# Patient Record
Sex: FEMALE | Race: WHITE | Employment: PART TIME | URBAN - METROPOLITAN AREA
[De-identification: names, ages, dates, MRNs, and addresses within clinical notes are randomized per-mention and may not be internally consistent; named-entity substitution may affect disease eponyms.]

---

## 2017-03-02 ENCOUNTER — CLINICAL SUPPORT (OUTPATIENT)
Dept: FAMILY MEDICINE CLINIC | Age: 22
End: 2017-03-02

## 2017-03-02 DIAGNOSIS — N94.6 PAINFUL MENSTRUAL PERIODS: Primary | ICD-10-CM

## 2017-03-02 RX ORDER — MEDROXYPROGESTERONE ACETATE 150 MG/ML
150 INJECTION, SUSPENSION INTRAMUSCULAR ONCE
Qty: 1 ML | Refills: 0 | Status: SHIPPED | COMMUNITY
Start: 2017-03-02 | End: 2017-03-02

## 2017-03-02 NOTE — MR AVS SNAPSHOT
Visit Information Date & Time Provider Department Dept. Phone Encounter #  
 3/2/2017 10:00 AM Rosita Shelton [place] [address] [phone] [id number] Upcoming Health Maintenance Date Due Pneumococcal 19-64 Medium Risk (1 of 1 - PPSV23) 9/1/2013 PAP AKA CERVICAL CYTOLOGY 12/16/2018 DTaP/Tdap/Td series (2 - Td) 12/1/2020 Allergies as of 3/2/2017  Review Complete On: 3/2/2017 By: Ramya Dudley LPN No Known Allergies Current Immunizations  Never Reviewed No immunizations on file. Not reviewed this visit You Were Diagnosed With   
  
 Codes Comments Painful menstrual periods    -  Primary ICD-10-CM: N94.6 ICD-9-CM: 491. 3 Vitals OB Status Injection Preferred Pharmacy Pharmacy Name Phone [facility], [address] [phone] Your Updated Medication List  
  
   
This list is accurate as of: 3/2/17 10:17 AM.  Always use your most recent med list.  
  
  
  
  
 * medroxyPROGESTERone 150 mg/mL Syrg Commonly known as:  DEPO-PROVERA  
1 mL by IntraMUSCular route every three (3) months. * medroxyPROGESTERone 150 mg/mL injection Commonly known as:  DEPO-PROVERA  
1 mL by IntraMUSCular route once for 1 dose. * Notice: This list has 2 medication(s) that are the same as other medications prescribed for you. Read the directions carefully, and ask your doctor or other care provider to review them with you. We Performed the Following [place] THER/PROPH/DIAG INJECTION, SUBCUT/IM [id number] CPT(R)] Patient Instructions Next depo- Provera injection due between may 18 and June 1, 2017. Introducing [facility] & HEALTH SERVICES! Kathe Aguilar introduces [employer] patient portal. Now you can access parts of your medical record, email your doctor's office, and request medication refills online.    
 
1. In your internet browser, go to [email or web address] 2. Click on the First Time User? Click Here link in the Sign In box. You will see the New Member Sign Up page. 3. Enter your [facility] [employer] Access Code exactly as it appears below. You will not need to use this code after youve completed the sign-up process. If you do not sign up before the expiration date, you must request a new code. · [facility] Access Code: [email or web address] Expires: 3/20/2017  8:08 AM 
 
4. Enter the last four digits of your Social Security Number (xxxx) and Date of Birth (mm/dd/yyyy) as indicated and click Submit. You will be taken to the next sign-up page. 5. Create a [place] Self Representation Document Preparationt ID. This will be your [facility] login ID and cannot be changed, so think of one that is secure and easy to remember. 6. Create a [facility] [facility] password. You can change your password at any time. 7. Enter your Password Reset Question and Answer. This can be used at a later time if you forget your password. 8. Enter your e-mail address. You will receive e-mail notification when new information is available in [address] 9. Click Sign Up. You can now view and download portions of your medical record. 10. Click the Download Summary menu link to download a portable copy of your medical information. If you have questions, please visit the Frequently Asked Questions section of the [employer] website. Remember, [employer] is NOT to be used for urgent needs. For medical emergencies, dial 911. Now available from your iPhone and Android! Please provide this summary of care documentation to your next provider. Your primary care clinician is listed as Kelsey Delacruz. If you have any questions after today's visit, please call [phone].

## 2017-05-18 ENCOUNTER — CLINICAL SUPPORT (OUTPATIENT)
Dept: FAMILY MEDICINE CLINIC | Age: 22
End: 2017-05-18

## 2017-05-18 DIAGNOSIS — N94.6 PAINFUL MENSTRUAL PERIODS: Primary | ICD-10-CM

## 2017-05-18 RX ORDER — MEDROXYPROGESTERONE ACETATE 150 MG/ML
150 INJECTION, SUSPENSION INTRAMUSCULAR ONCE
Qty: 1 ML | Refills: 0 | Status: SHIPPED | COMMUNITY
Start: 2017-05-18 | End: 2017-05-18

## 2017-05-18 NOTE — MR AVS SNAPSHOT
Visit Information Date & Time Provider Department Dept. Phone Encounter #  
 5/18/2017  1:00 PM Rosita Shelton [facility] 910 [phone] [id number] Upcoming Health Maintenance Date Due Pneumococcal 19-64 Medium Risk (1 of 1 - PPSV23) 9/1/2013 INFLUENZA AGE 9 TO ADULT 8/1/2017 PAP AKA CERVICAL CYTOLOGY 12/16/2018 DTaP/Tdap/Td series (2 - Td) 12/1/2020 Allergies as of 5/18/2017  Review Complete On: 5/18/2017 By: Rossy Connor LPN No Known Allergies Current Immunizations  Never Reviewed No immunizations on file. Not reviewed this visit You Were Diagnosed With   
  
 Codes Comments Painful menstrual periods    -  Primary ICD-10-CM: N94.6 ICD-9-CM: 907. 3 Vitals OB Status Smoking Status Injection Current Every Day Smoker Preferred Pharmacy Pharmacy Name Phone [facility], [address] [phone] Your Updated Medication List  
  
   
This list is accurate as of: 5/18/17  1:08 PM.  Always use your most recent med list.  
  
  
  
  
 * medroxyPROGESTERone 150 mg/mL Syrg Commonly known as:  DEPO-PROVERA  
1 mL by IntraMUSCular route every three (3) months. * medroxyPROGESTERone 150 mg/mL injection Commonly known as:  DEPO-PROVERA  
1 mL by IntraMUSCular route once for 1 dose. * Notice: This list has 2 medication(s) that are the same as other medications prescribed for you. Read the directions carefully, and ask your doctor or other care provider to review them with you. We Performed the Following [place] THER/PROPH/DIAG INJECTION, SUBCUT/IM [id number] CPT(R)] Patient Instructions Next depo- Provera injection due between August 3 and 17, 2017 Introducing [facility] & HEALTH SERVICES!    
 Peg Hemp introduces [employer] patient portal. Now you can access parts of your medical record, email your doctor's office, and request medication refills online. 1. In your internet browser, go to [email or web address] 2. Click on the First Time User? Click Here link in the Sign In box. You will see the New Member Sign Up page. 3. Enter your [employer] Access Code exactly as it appears below. You will not need to use this code after youve completed the sign-up process. If you do not sign up before the expiration date, you must request a new code. · [employer] Access Code: [facility] Expires: 8/16/2017  1:07 PM 
 
4. Enter the last four digits of your Social Security Number (xxxx) and Date of Birth (mm/dd/yyyy) as indicated and click Submit. You will be taken to the next sign-up page. 5. Create a Co[employer]estt ID. This will be your [employer] login ID and cannot be changed, so think of one that is secure and easy to remember. 6. Create a [employer] password. You can change your password at any time. 7. Enter your Password Reset Question and Answer. This can be used at a later time if you forget your password. 8. Enter your e-mail address. You will receive e-mail notification when new information is available in [address] 9. Click Sign Up. You can now view and download portions of your medical record. 10. Click the Download Summary menu link to download a portable copy of your medical information. If you have questions, please visit the Frequently Asked Questions section of the [employer] website. Remember, [employer] is NOT to be used for urgent needs. For medical emergencies, dial 911. Now available from your iPhone and Android! Please provide this summary of care documentation to your next provider. Your primary care clinician is listed as Jana Estrella. If you have any questions after today's visit, please call [phone].

## 2017-05-18 NOTE — PROGRESS NOTES
Given depo-provera injection as verbally ordered by Deisy Rich NP.  Verbal and written instructions on when next injection due and she voiced understanding

## 2017-08-17 ENCOUNTER — CLINICAL SUPPORT (OUTPATIENT)
Dept: FAMILY MEDICINE CLINIC | Age: 22
End: 2017-08-17

## 2017-08-17 VITALS
DIASTOLIC BLOOD PRESSURE: 64 MMHG | HEART RATE: 98 BPM | RESPIRATION RATE: 14 BRPM | HEIGHT: 63 IN | TEMPERATURE: 98 F | OXYGEN SATURATION: 97 % | SYSTOLIC BLOOD PRESSURE: 105 MMHG | BODY MASS INDEX: 21.09 KG/M2 | WEIGHT: 119 LBS

## 2017-08-17 DIAGNOSIS — N94.6 PAINFUL MENSTRUAL PERIODS: Primary | ICD-10-CM

## 2017-08-17 RX ORDER — MEDROXYPROGESTERONE ACETATE 150 MG/ML
150 INJECTION, SUSPENSION INTRAMUSCULAR ONCE
Qty: 1 ML | Refills: 0 | Status: SHIPPED | COMMUNITY
Start: 2017-08-17 | End: 2017-08-17

## 2017-08-17 NOTE — PROGRESS NOTES
Depo-provera injection given as verbally ordered by Dr. Anna Palma. She voiced understanding of when to come back for next depo-provera injection and AVS given with documentation.

## 2017-08-17 NOTE — MR AVS SNAPSHOT
Visit Information Date & Time Provider Department Dept. Phone Encounter #  
 8/17/2017 10:10 AM Rosita Shelton [place] [address] [phone] [id number] Upcoming Health Maintenance Date Due Pneumococcal 19-64 Medium Risk (1 of 1 - PPSV23) 9/1/2013 INFLUENZA AGE 9 TO ADULT 8/1/2017 PAP AKA CERVICAL CYTOLOGY 12/16/2018 DTaP/Tdap/Td series (2 - Td) 12/1/2020 Allergies as of 8/17/2017  Review Complete On: 8/17/2017 By: Anthony Johansen LPN No Known Allergies Current Immunizations  Never Reviewed No immunizations on file. Not reviewed this visit You Were Diagnosed With   
  
 Codes Comments Painful menstrual periods    -  Primary ICD-10-CM: N94.6 ICD-9-CM: 614. 3 Vitals OB Status Smoking Status Injection Current Every Day Smoker Preferred Pharmacy Pharmacy Name Phone [facility], [address] [phone] Your Updated Medication List  
  
   
This list is accurate as of: 8/17/17 11:00 AM.  Always use your most recent med list.  
  
  
  
  
 * medroxyPROGESTERone 150 mg/mL Syrg Commonly known as:  DEPO-PROVERA  
1 mL by IntraMUSCular route every three (3) months. * medroxyPROGESTERone 150 mg/mL injection Commonly known as:  DEPO-PROVERA  
1 mL by IntraMUSCular route once for 1 dose. * Notice: This list has 2 medication(s) that are the same as other medications prescribed for you. Read the directions carefully, and ask your doctor or other care provider to review them with you. We Performed the Following [place] THER/PROPH/DIAG INJECTION, SUBCUT/IM [id number] CPT(R)] Patient Instructions Next Depo-Provera injection due November 2-16, 2017 Introducing [facility] & HEALTH SERVICES!    
 [facility] introduces [place] patient portal. Now you can access parts of your medical record, email your doctor's office, and request medication refills online. 1. In your internet browser, go to [email or web address] 2. Click on the First Time User? Click Here link in the Sign In box. You will see the New Member Sign Up page. 3. Enter your [employer] Access Code exactly as it appears below. You will not need to use this code after youve completed the sign-up process. If you do not sign up before the expiration date, you must request a new code. · [employer] Access Code: [email or web address] Expires: 11/15/2017 11:00 AM 
 
4. Enter the last four digits of your Social Security Number (xxxx) and Date of Birth (mm/dd/yyyy) as indicated and click Submit. You will be taken to the next sign-up page. 5. Create a [employer] ID. This will be your [employer] login ID and cannot be changed, so think of one that is secure and easy to remember. 6. Create a [employer] password. You can change your password at any time. 7. Enter your Password Reset Question and Answer. This can be used at a later time if you forget your password. 8. Enter your e-mail address. You will receive e-mail notification when new information is available in [address] 9. Click Sign Up. You can now view and download portions of your medical record. 10. Click the Download Summary menu link to download a portable copy of your medical information. If you have questions, please visit the Frequently Asked Questions section of the [employer] website. Remember, [employer] is NOT to be used for urgent needs. For medical emergencies, dial 911. Now available from your iPhone and Android! Please provide this summary of care documentation to your next provider. Your primary care clinician is listed as Irma Bray. If you have any questions after today's visit, please call [phone].

## 2017-09-12 RX ORDER — MEDROXYPROGESTERONE ACETATE 150 MG/ML
150 INJECTION, SUSPENSION INTRAMUSCULAR
Qty: 1 SYRINGE | Refills: 3
Start: 2017-09-12 | End: 2018-01-30 | Stop reason: SDUPTHER

## 2017-09-12 NOTE — TELEPHONE ENCOUNTER
Chief Complaint   Patient presents with    Medication Refill     Last refill:   8 months ago (12/20/2016)        medroxyPROGESTERone (DEPO-PROVERA) 150 mg/mL syrg       1 mL by IntraMUSCular route every three (3) months.       Dispense: 1 Syringe     Refills: 3     Start: 2/20/2017     By: Omid Newton MD       Future Appointments:  11/10/2017 3:00 PM    He Dc      Last Appointment With Me:  Visit date not found      Last Appointment My Department:  8/17/2017

## 2017-11-10 ENCOUNTER — CLINICAL SUPPORT (OUTPATIENT)
Dept: FAMILY MEDICINE CLINIC | Age: 22
End: 2017-11-10

## 2017-11-10 VITALS
HEIGHT: 63 IN | OXYGEN SATURATION: 95 % | SYSTOLIC BLOOD PRESSURE: 110 MMHG | BODY MASS INDEX: 20.55 KG/M2 | HEART RATE: 88 BPM | RESPIRATION RATE: 18 BRPM | DIASTOLIC BLOOD PRESSURE: 73 MMHG | TEMPERATURE: 98.5 F | WEIGHT: 116 LBS

## 2017-11-10 DIAGNOSIS — N94.6 PAINFUL MENSTRUATION: Primary | ICD-10-CM

## 2017-11-10 RX ORDER — MEDROXYPROGESTERONE ACETATE 150 MG/ML
150 INJECTION, SUSPENSION INTRAMUSCULAR ONCE
Qty: 1 ML | Refills: 0 | Status: SHIPPED | COMMUNITY
Start: 2017-11-10 | End: 2017-11-10

## 2017-11-10 NOTE — PROGRESS NOTES
Chief Complaint   Patient presents with    Depo     Administered Medrocxyprogesterone Acetate Injectable suspension by IM in Left Glutues, pt tolerated well. Patient instructions given to return between January 6-February 9.

## 2017-11-10 NOTE — MR AVS SNAPSHOT
Visit Information Date & Time Provider Department Dept. Phone Encounter #  
 11/10/2017  3:00 PM Rosita Shelton [facility] 74 [phone] [id number] Upcoming Health Maintenance Date Due Pneumococcal 19-64 Medium Risk (1 of 1 - PPSV23) 9/1/2013 Influenza Age 5 to Adult 8/1/2017 PAP AKA CERVICAL CYTOLOGY 12/16/2018 DTaP/Tdap/Td series (2 - Td) 12/1/2020 Allergies as of 11/10/2017  Review Complete On: 11/10/2017 By: Caryl Sandy LPN No Known Allergies Current Immunizations  Never Reviewed No immunizations on file. Not reviewed this visit You Were Diagnosed With   
  
 Codes Comments Painful menstruation    -  Primary ICD-10-CM: N94.6 ICD-9-CM: 354. 3 Vitals BP Pulse Temp Resp Height(growth percentile) Weight(growth percentile) 110/73 (BP 1 Location: Right arm, BP Patient Position: Sitting) 88 98.5 °F (36.9 °C) (Oral) 18 5' 3\" (1.6 m) 116 lb (52.6 kg) SpO2 BMI OB Status Smoking Status 95% 20.55 kg/m2 Injection Current Every Day Smoker BMI and BSA Data Body Mass Index Body Surface Area 20.55 kg/m 2 1.53 m 2 Preferred Pharmacy Pharmacy Name Phone [facility], [address] [phone] Your Updated Medication List  
  
   
This list is accurate as of: 11/10/17  3:10 PM.  Always use your most recent med list.  
  
  
  
  
 * medroxyPROGESTERone 150 mg/mL Syrg Commonly known as:  DEPO-PROVERA  
1 mL by IntraMUSCular route every three (3) months. * medroxyPROGESTERone 150 mg/mL injection Commonly known as:  DEPO-PROVERA  
1 mL by IntraMUSCular route once for 1 dose. * Notice: This list has 2 medication(s) that are the same as other medications prescribed for you. Read the directions carefully, and ask your doctor or other care provider to review them with you. We Performed the Following [place] THER/PROPH/DIAG INJECTION, SUBCUT/IM [id number] CPT(R)] Patient Instructions Patient to return January 26-February 9, 2018 Introducing [facility] & HEALTH SERVICES! Justen Camarena introduces [employer] patient portal. Now you can access parts of your medical record, email your doctor's office, and request medication refills online. 1. In your internet browser, go to [email or web address].[email or web address] Services. Wellcoin/D.[employer] Loans & Services 2. Click on the First Time User? Click Here link in the Sign In box. You will see the New Member Sign Up page. 3. Enter your Ku[employer] Access Code exactly as it appears below. You will not need to use this code after youve completed the sign-up process. If you do not sign up before the expiration date, you must request a new code. · Ku[employer] Access Code: [email or web address] Expires: 11/15/2017 10:00 AM 
 
4. Enter the last four digits of your Social Security Number (xxxx) and Date of Birth (mm/dd/yyyy) as indicated and click Submit. You will be taken to the next sign-up page. 5. Create a Kuapay ID. This will be your Ku[employer] login ID and cannot be changed, so think of one that is secure and easy to remember. 6. Create a Ku[employer]ay password. You can change your password at any time. 7. Enter your Password Reset Question and Answer. This can be used at a later time if you forget your password. 8. Enter your e-mail address. You will receive e-mail notification when new information is available in [address] 9. Click Sign Up. You can now view and download portions of your medical record. 10. Click the Download Summary menu link to download a portable copy of your medical information. If you have questions, please visit the Frequently Asked Questions section of the Ku[employer] website. Remember, Ku[employer]ay is NOT to be used for urgent needs. For medical emergencies, dial 911. Now available from your iPhone and Android! Please provide this summary of care documentation to your next provider. Your primary care clinician is listed as Sulaiman Henry. If you have any questions after today's visit, please call [phone].

## 2018-01-30 RX ORDER — MEDROXYPROGESTERONE ACETATE 150 MG/ML
150 INJECTION, SUSPENSION INTRAMUSCULAR
Qty: 1 SYRINGE | Refills: 3
Start: 2018-01-30 | End: 2019-02-05 | Stop reason: SDUPTHER

## 2018-02-01 ENCOUNTER — TELEPHONE (OUTPATIENT)
Dept: FAMILY MEDICINE CLINIC | Age: 23
End: 2018-02-01

## 2019-02-05 ENCOUNTER — OFFICE VISIT (OUTPATIENT)
Dept: FAMILY MEDICINE CLINIC | Age: 24
End: 2019-02-05

## 2019-02-05 VITALS
HEART RATE: 97 BPM | OXYGEN SATURATION: 97 % | TEMPERATURE: 99 F | BODY MASS INDEX: 23.39 KG/M2 | HEIGHT: 63 IN | DIASTOLIC BLOOD PRESSURE: 82 MMHG | WEIGHT: 132 LBS | SYSTOLIC BLOOD PRESSURE: 137 MMHG | RESPIRATION RATE: 16 BRPM

## 2019-02-05 DIAGNOSIS — N92.6 IRREGULAR MENSES: ICD-10-CM

## 2019-02-05 DIAGNOSIS — Z87.09 HISTORY OF STREP PHARYNGITIS: ICD-10-CM

## 2019-02-05 DIAGNOSIS — J02.9 SORE THROAT: ICD-10-CM

## 2019-02-05 DIAGNOSIS — R50.9 FEVER, UNSPECIFIED FEVER CAUSE: ICD-10-CM

## 2019-02-05 DIAGNOSIS — J06.9 URI WITH COUGH AND CONGESTION: Primary | ICD-10-CM

## 2019-02-05 LAB
HCG URINE, QL. (POC): NEGATIVE
S PYO AG THROAT QL: NEGATIVE
VALID INTERNAL CONTROL?: YES

## 2019-02-05 RX ORDER — MEDROXYPROGESTERONE ACETATE 150 MG/ML
150 INJECTION, SUSPENSION INTRAMUSCULAR
Qty: 1 SYRINGE | Refills: 3
Start: 2019-02-05 | End: 2019-02-06 | Stop reason: SDUPTHER

## 2019-02-05 RX ORDER — AZITHROMYCIN 250 MG/1
TABLET, FILM COATED ORAL
Qty: 6 TAB | Refills: 0 | Status: SHIPPED | OUTPATIENT
Start: 2019-02-05 | End: 2019-02-10

## 2019-02-05 NOTE — PROGRESS NOTES
Identified pt with two pt identifiers(name and ). Chief Complaint   Patient presents with    New Patient    Sore Throat    Generalized Body Aches    Cough    Nasal Discharge    Contraception     would like to discuss changing BC from depo to another form as she has been on this for 9 years and has read it is not safe to be on for extended periods of time        Health Maintenance Due   Topic    Pneumococcal 19-64 Medium Risk (1 of 1 - PPSV23)    PAP AKA CERVICAL CYTOLOGY        Wt Readings from Last 3 Encounters:   19 132 lb (59.9 kg)   11/10/17 116 lb (52.6 kg)   17 119 lb (54 kg)     Temp Readings from Last 3 Encounters:   19 99 °F (37.2 °C) (Oral)   11/10/17 98.5 °F (36.9 °C) (Oral)   17 98 °F (36.7 °C)     BP Readings from Last 3 Encounters:   19 137/82   11/10/17 110/73   17 105/64     Pulse Readings from Last 3 Encounters:   19 97   11/10/17 88   17 98         Learning Assessment:  :     Learning Assessment 2016   PRIMARY LEARNER Patient Patient   HIGHEST LEVEL OF EDUCATION - PRIMARY LEARNER  SOME COLLEGE -   BARRIERS PRIMARY LEARNER NONE -   CO-LEARNER CAREGIVER No -   PRIMARY LANGUAGE ENGLISH ENGLISH   LEARNER PREFERENCE PRIMARY DEMONSTRATION DEMONSTRATION     LISTENING -     READING -   ANSWERED BY patient pt   RELATIONSHIP SELF SELF       Depression Screening:  :     PHQ over the last two weeks 2019   Little interest or pleasure in doing things Not at all   Feeling down, depressed, irritable, or hopeless Not at all   Total Score PHQ 2 0       Fall Risk Assessment:  :     No flowsheet data found. Abuse Screening:  :     Abuse Screening Questionnaire 2019   Do you ever feel afraid of your partner? N   Are you in a relationship with someone who physically or mentally threatens you? N   Is it safe for you to go home?  Y       Coordination of Care Questionnaire:  :     1) Have you been to an emergency room, urgent care clinic since your last visit? no   Hospitalized since your last visit? no             2) Have you seen or consulted any other health care providers outside of [address] [place] [address] since your last visit? no  (Include any pap smears or colon screenings in this section.)    3) Do you have an Advance Directive on file? no  Are you interested in receiving information about Advance Directives? no    Patient is accompanied by self. Reviewed record in preparation for visit and have obtained necessary documentation. Medication reconciliation up to date and corrected with patient at this time. Order for POC Rapid Strep Testing placed per [facility] Karlene's verbal order.

## 2019-02-05 NOTE — PATIENT INSTRUCTIONS

## 2019-02-05 NOTE — PROGRESS NOTES
HISTORY OF PRESENT ILLNESS  Rachael Diaz is a 24 y.o. female. HPI  Pt presents as a new patient with \"refills of depo and cold symptoms\"    Pt states that she has been on depo for 9 years, and has really liked the way that it regulated her periods. She is a smoker, and does not want to quit smoking. She has no interest in an IUD. Pt states that she is willing to try a different form of contraception, or will stay on depo, should I think this is best.    In addition, patient has been dealing with cold symptoms  Sore throat  Cough  Nasal congestion  Body aches  Diarrhea  No fever  Review of Systems   Constitutional: Negative for fever. HENT: Positive for congestion and sore throat. Respiratory: Positive for cough. Gastrointestinal: Positive for diarrhea. Physical Exam   Constitutional: She is oriented to person, place, and time. She appears well-developed and well-nourished. HENT:   Head: Normocephalic and atraumatic. Right Ear: Hearing, tympanic membrane, external ear and ear canal normal.   Left Ear: Hearing, tympanic membrane, external ear and ear canal normal.   Nose: Mucosal edema and rhinorrhea present. Mouth/Throat: Posterior oropharyngeal erythema present. Neck: Normal range of motion. Neck supple. Cardiovascular: Normal rate, regular rhythm and normal heart sounds. Pulmonary/Chest: Effort normal. She has wheezes in the right upper field and the left upper field. She has rhonchi in the right lower field and the left lower field. Lymphadenopathy:     She has no cervical adenopathy. Neurological: She is alert and oriented to person, place, and time. Skin: Skin is warm and dry. Psychiatric: She has a normal mood and affect. Her behavior is normal.       ASSESSMENT and PLAN    ICD-10-CM ICD-9-CM    1. URI with cough and congestion J06.9 465.9 azithromycin (ZITHROMAX) 250 mg tablet   2. Sore throat J02.9 462 AMB POC RAPID STREP TEST   3.  History of strep pharyngitis Z87.09 V12.09 AMB POC RAPID STREP TEST   4. Fever, unspecified fever cause R50.9 780.60 AMB POC RAPID STREP TEST   5. Irregular menses N92.6 626.4 AMB POC URINE PREGNANCY TEST, VISUAL COLOR COMPARISON      medroxyPROGESTERone (DEPO-PROVERA) 150 mg/mL syrg     Informed patient that I have sent medication to the pharmacy, and she should take as prescribed. Educated about risks of OCP with smoking, and depo is safest option at this time, if patient would not like an IUD. Pt informed to return to office with worsening of symptoms, or PRN with any questions or concerns. Pt verbalizes understanding of plan of care and denies further questions or concerns at this time.

## 2019-02-06 RX ORDER — MEDROXYPROGESTERONE ACETATE 150 MG/ML
150 INJECTION, SUSPENSION INTRAMUSCULAR
Qty: 1 SYRINGE | Refills: 3 | Status: SHIPPED | OUTPATIENT
Start: 2019-02-06 | End: 2020-02-17

## 2019-11-11 DIAGNOSIS — N92.6 IRREGULAR MENSES: ICD-10-CM

## 2019-11-11 RX ORDER — MEDROXYPROGESTERONE ACETATE 150 MG/ML
150 INJECTION, SUSPENSION INTRAMUSCULAR
Qty: 1 SYRINGE | Refills: 3
Start: 2019-11-11

## 2019-11-11 NOTE — TELEPHONE ENCOUNTER
It looks like there should still be refills for DEPO at [facility] from original RX 2/6/19. It had refills good for a year.

## 2020-01-20 ENCOUNTER — OFFICE VISIT (OUTPATIENT)
Dept: FAMILY MEDICINE CLINIC | Age: 25
End: 2020-01-20

## 2020-01-20 ENCOUNTER — HOSPITAL ENCOUNTER (OUTPATIENT)
Dept: LAB | Age: 25
Discharge: HOME | End: 2020-01-20

## 2020-01-20 VITALS
RESPIRATION RATE: 18 BRPM | SYSTOLIC BLOOD PRESSURE: 120 MMHG | HEART RATE: 88 BPM | BODY MASS INDEX: 21.44 KG/M2 | HEIGHT: 63 IN | WEIGHT: 121 LBS | DIASTOLIC BLOOD PRESSURE: 70 MMHG

## 2020-01-20 DIAGNOSIS — R42 DIZZINESS: Primary | ICD-10-CM

## 2020-01-20 DIAGNOSIS — R42 DIZZINESS: ICD-10-CM

## 2020-01-20 DIAGNOSIS — Z13.1 SCREENING FOR DIABETES MELLITUS: ICD-10-CM

## 2020-01-20 DIAGNOSIS — Z13.29 SCREENING FOR THYROID DISORDER: ICD-10-CM

## 2020-01-20 NOTE — PATIENT INSTRUCTIONS
Dizziness: Care Instructions  Your Care Instructions  Dizziness is the feeling of unsteadiness or fuzziness in your head. It is different than having vertigo, which is a feeling that the room is spinning or that you are moving or falling. It is also different from lightheadedness, which is the feeling that you are about to faint. It can be hard to know what causes dizziness. Some people feel dizzy when they have migraine headaches. Sometimes bouts of flu can make you feel dizzy. Some medical conditions, such as heart problems or high blood pressure, can make you feel dizzy. Many medicines can cause dizziness, including medicines for high blood pressure, pain, or anxiety. If a medicine causes your symptoms, your doctor may recommend that you stop or change the medicine. If it is a problem with your heart, you may need medicine to help your heart work better. If there is no clear reason for your symptoms, your doctor may suggest watching and waiting for a while to see if the dizziness goes away on its own. Follow-up care is a key part of your treatment and safety. Be sure to make and go to all appointments, and call your doctor if you are having problems. It's also a good idea to know your test results and keep a list of the medicines you take. How can you care for yourself at home? · If your doctor recommends or prescribes medicine, take it exactly as directed. Call your doctor if you think you are having a problem with your medicine. · Do not drive while you feel dizzy. · Try to prevent falls. Steps you can take include:  ? Using nonskid mats, adding grab bars near the tub, and using night-lights. ? Clearing your home so that walkways are free of anything you might trip on.  ? Letting family and friends know that you have been feeling dizzy. This will help them know how to help you. When should you call for help? Call 911 anytime you think you may need emergency care.  For example, call if:    · You passed out (lost consciousness).     · You have dizziness along with symptoms of a heart attack. These may include:  ? Chest pain or pressure, or a strange feeling in the chest.  ? Sweating. ? Shortness of breath. ? Nausea or vomiting. ? Pain, pressure, or a strange feeling in the back, neck, jaw, or upper belly or in one or both shoulders or arms. ? Lightheadedness or sudden weakness. ? A fast or irregular heartbeat.     · You have symptoms of a stroke. These may include:  ? Sudden numbness, tingling, weakness, or loss of movement in your face, arm, or leg, especially on only one side of your body. ? Sudden vision changes. ? Sudden trouble speaking. ? Sudden confusion or trouble understanding simple statements. ? Sudden problems with walking or balance. ? A sudden, severe headache that is different from past headaches.    Call your doctor now or seek immediate medical care if:    · You feel dizzy and have a fever, headache, or ringing in your ears.     · You have new or increased nausea and vomiting.     · Your dizziness does not go away or comes back.    Watch closely for changes in your health, and be sure to contact your doctor if:    · You do not get better as expected. Where can you learn more? Go to http://gil-joey.info/. Enter Z419 in the search box to learn more about \"Dizziness: Care Instructions. \"  Current as of: June 26, 2019  Content Version: 12.2  © 5580-1025 [employer]. Care instructions adapted under license by [employer] (which disclaims liability or warranty for this information). If you have questions about a medical condition or this instruction, always ask your healthcare professional. [place] [address] any warranty or liability for your use of this information.

## 2020-01-20 NOTE — PROGRESS NOTES
HISTORY OF PRESENT ILLNESS  Maddison Meehan is a 21 y.o. female. HPI   Pt presents with \"feeling faint/dizzy at times, weight loss\"  Pt states that this past week she had an episode where she felt really faint. She made lunch, and took a nap, and when she woke up she still felt \"whoozy\". She states that she has also lost 15 pounds in the last 3 months  Of note, she is 4 months post stopping alcohol and has been going to [employer]. She notes that she has a lot of stress with job changes, etc.  Increased thirst is also noted, and she has been drinking a lot more water then usual  Review of Systems   Constitutional: Positive for malaise/fatigue and weight loss. Negative for fever. HENT: Negative for congestion. Gastrointestinal: Negative for abdominal pain, diarrhea and vomiting. Physical Exam  Constitutional:       Appearance: Normal appearance. HENT:      Head: Normocephalic and atraumatic. Neck:      Musculoskeletal: Normal range of motion and neck supple. Cardiovascular:      Rate and Rhythm: Normal rate and regular rhythm. Heart sounds: Normal heart sounds. Pulmonary:      Effort: Pulmonary effort is normal.      Breath sounds: Normal breath sounds. Neurological:      Mental Status: She is alert. Psychiatric:         Mood and Affect: Mood normal.         Behavior: Behavior normal.         ASSESSMENT and PLAN    ICD-10-CM ICD-9-CM    1. Dizziness R42 780.4 CBC W/O DIFF      METABOLIC PANEL, COMPREHENSIVE      THYROID CASCADE PROFILE      HEMOGLOBIN A1C WITH EAG   2. Screening for diabetes mellitus Z13.1 V77.1 HEMOGLOBIN A1C WITH EAG   3. Screening for thyroid disorder Z13.29 V77.0 THYROID CASCADE PROFILE     Educated that we will notify her when labs return, and inform her of any change in plan of care at that time. Should return for pap smear ASAP    Pt informed to return to office with worsening of symptoms, or PRN with any questions or concerns.   Pt verbalizes understanding of plan of care and denies further questions or concerns at this time.

## 2020-01-21 ENCOUNTER — TELEPHONE (OUTPATIENT)
Dept: FAMILY MEDICINE CLINIC | Age: 25
End: 2020-01-21

## 2020-01-21 LAB
ALBUMIN SERPL-MCNC: 4.5 G/DL (ref 3.5–5)
ALBUMIN/GLOB SERPL: 1.9 {RATIO} (ref 1.1–2.2)
ALP SERPL-CCNC: 58 U/L (ref 45–117)
ALT SERPL-CCNC: 18 U/L (ref 12–78)
ANION GAP SERPL CALC-SCNC: 6 MMOL/L (ref 5–15)
AST SERPL-CCNC: 12 U/L (ref 15–37)
BILIRUB SERPL-MCNC: 0.2 MG/DL (ref 0.2–1)
BUN SERPL-MCNC: 12 MG/DL (ref 6–20)
BUN/CREAT SERPL: 14 (ref 12–20)
CALCIUM SERPL-MCNC: 9.2 MG/DL (ref 8.5–10.1)
CHLORIDE SERPL-SCNC: 111 MMOL/L (ref 97–108)
CO2 SERPL-SCNC: 24 MMOL/L (ref 21–32)
CREAT SERPL-MCNC: 0.87 MG/DL (ref 0.55–1.02)
ERYTHROCYTE [DISTWIDTH] IN BLOOD BY AUTOMATED COUNT: 12.6 % (ref 11.5–14.5)
EST. AVERAGE GLUCOSE BLD GHB EST-MCNC: 100 MG/DL
GLOBULIN SER CALC-MCNC: 2.4 G/DL (ref 2–4)
GLUCOSE SERPL-MCNC: 121 MG/DL (ref 65–100)
HBA1C MFR BLD: 5.1 % (ref 4–5.6)
HCT VFR BLD AUTO: 42.8 % (ref 35–47)
HGB BLD-MCNC: 13.3 G/DL (ref 11.5–16)
MCH RBC QN AUTO: 29.7 PG (ref 26–34)
MCHC RBC AUTO-ENTMCNC: 31.1 G/DL (ref 30–36.5)
MCV RBC AUTO: 95.5 FL (ref 80–99)
NRBC # BLD: 0 K/UL (ref 0–0.01)
NRBC BLD-RTO: 0 PER 100 WBC
PLATELET # BLD AUTO: 324 K/UL (ref 150–400)
PMV BLD AUTO: 9.7 FL (ref 8.9–12.9)
POTASSIUM SERPL-SCNC: 4.4 MMOL/L (ref 3.5–5.1)
PROT SERPL-MCNC: 6.9 G/DL (ref 6.4–8.2)
RBC # BLD AUTO: 4.48 M/UL (ref 3.8–5.2)
SODIUM SERPL-SCNC: 141 MMOL/L (ref 136–145)
WBC # BLD AUTO: 5.7 K/UL (ref 3.6–11)

## 2020-01-21 NOTE — TELEPHONE ENCOUNTER
Returned call to pt and she was advised we are still waiting on the thyroid lab, however, the rest of her labs were stable. She verbalized understanding.

## 2020-01-21 NOTE — TELEPHONE ENCOUNTER
----- Message from Luis Antonio Wiseman sent at 1/21/2020  1:35 PM EST -----  Regarding: [place] Ricardo/Telephone  General Message/Vendor Calls    Caller's first and last name: Lorinda Seip      Reason for call:test results      Callback required yes/no and why:yes      Best contact number(s):[phone]      Details to clarify the request:Patient is trying to get her test results      Luis Antonio Wiseman

## 2020-01-22 ENCOUNTER — TELEPHONE (OUTPATIENT)
Dept: FAMILY MEDICINE CLINIC | Age: 25
End: 2020-01-22

## 2020-01-22 LAB — TSH SERPL-ACNC: 1.12 UIU/ML (ref 0.45–4.5)

## 2020-01-24 ENCOUNTER — OFFICE VISIT (OUTPATIENT)
Dept: FAMILY MEDICINE CLINIC | Age: 25
End: 2020-01-24

## 2020-01-24 ENCOUNTER — HOSPITAL ENCOUNTER (OUTPATIENT)
Dept: LAB | Age: 25
Discharge: HOME | End: 2020-01-24

## 2020-01-24 ENCOUNTER — HOSPITAL ENCOUNTER (OUTPATIENT)
Dept: LAB | Age: 25
Discharge: HOME | End: 2020-01-24
Payer: MEDICAID

## 2020-01-24 VITALS
DIASTOLIC BLOOD PRESSURE: 70 MMHG | WEIGHT: 121 LBS | TEMPERATURE: 98.4 F | BODY MASS INDEX: 21.44 KG/M2 | HEIGHT: 63 IN | HEART RATE: 95 BPM | OXYGEN SATURATION: 99 % | SYSTOLIC BLOOD PRESSURE: 124 MMHG | RESPIRATION RATE: 16 BRPM

## 2020-01-24 DIAGNOSIS — Z01.419 WELL WOMAN EXAM WITH ROUTINE GYNECOLOGICAL EXAM: ICD-10-CM

## 2020-01-24 DIAGNOSIS — Z01.419 WELL WOMAN EXAM WITH ROUTINE GYNECOLOGICAL EXAM: Primary | ICD-10-CM

## 2020-01-24 PROCEDURE — 87625 HPV TYPES 16 & 18 ONLY: CPT

## 2020-01-24 PROCEDURE — 87624 HPV HI-RISK TYP POOLED RSLT: CPT

## 2020-01-24 PROCEDURE — 88175 CYTOPATH C/V AUTO FLUID REDO: CPT

## 2020-01-24 NOTE — PROGRESS NOTES
Identified pt with two pt identifiers(name and ). Chief Complaint   Patient presents with    Well Woman        Health Maintenance Due   Topic    Pneumococcal 0-64 years (1 of 1 - PPSV23)    PAP AKA CERVICAL CYTOLOGY     Influenza Age 5 to Adult        Wt Readings from Last 3 Encounters:   20 121 lb (54.9 kg)   20 121 lb (54.9 kg)   19 132 lb (59.9 kg)     Temp Readings from Last 3 Encounters:   20 98.4 °F (36.9 °C) (Oral)   19 99 °F (37.2 °C) (Oral)   11/10/17 98.5 °F (36.9 °C) (Oral)     BP Readings from Last 3 Encounters:   20 124/70   20 120/70   19 137/82     Pulse Readings from Last 3 Encounters:   20 95   20 88   19 97         Learning Assessment:  :     Learning Assessment 2016   PRIMARY LEARNER Patient Patient   HIGHEST LEVEL OF EDUCATION - PRIMARY LEARNER  SOME COLLEGE -   BARRIERS PRIMARY LEARNER NONE -   CO-LEARNER CAREGIVER No -   PRIMARY LANGUAGE ENGLISH ENGLISH   LEARNER PREFERENCE PRIMARY DEMONSTRATION DEMONSTRATION     LISTENING -     READING -   ANSWERED BY patient pt   RELATIONSHIP SELF SELF       Depression Screening:  :     3 most recent PHQ Screens 2020   Little interest or pleasure in doing things Not at all   Feeling down, depressed, irritable, or hopeless Not at all   Total Score PHQ 2 0       Fall Risk Assessment:  :     No flowsheet data found. Abuse Screening:  :     Abuse Screening Questionnaire 2020   Do you ever feel afraid of your partner? N N   Are you in a relationship with someone who physically or mentally threatens you? N N   Is it safe for you to go home?  Y Y       Coordination of Care Questionnaire:  :     1) Have you been to an emergency room, urgent care clinic since your last visit? no   Hospitalized since your last visit? no             2) Have you seen or consulted any other health care providers outside of [address] [place] [address] since your last visit? no (Include any pap smears or colon screenings in this section.)    3) Do you have an Advance Directive on file? no  Are you interested in receiving information about Advance Directives? no    Patient is accompanied by her toddler daughter. I have received verbal consent from Emperatriz Brewer to discuss any/all medical information while they are present in the room. Reviewed record in preparation for visit and have obtained necessary documentation. Medication reconciliation up to date and corrected with patient at this time.

## 2020-01-24 NOTE — PATIENT INSTRUCTIONS
Well Visit, Ages 25 to 48: Care Instructions  Your Care Instructions    Physical exams can help you stay healthy. Your doctor has checked your overall health and may have suggested ways to take good care of yourself. He or she also may have recommended tests. At home, you can help prevent illness with healthy eating, regular exercise, and other steps. Follow-up care is a key part of your treatment and safety. Be sure to make and go to all appointments, and call your doctor if you are having problems. It's also a good idea to know your test results and keep a list of the medicines you take. How can you care for yourself at home? · Reach and stay at a healthy weight. This will lower your risk for many problems, such as obesity, diabetes, heart disease, and high blood pressure. · Get at least 30 minutes of physical activity on most days of the week. Walking is a good choice. You also may want to do other activities, such as running, swimming, cycling, or playing tennis or team sports. Discuss any changes in your exercise program with your doctor. · Do not smoke or allow others to smoke around you. If you need help quitting, talk to your doctor about stop-smoking programs and medicines. These can increase your chances of quitting for good. · Talk to your doctor about whether you have any risk factors for sexually transmitted infections (STIs). Having one sex partner (who does not have STIs and does not have sex with anyone else) is a good way to avoid these infections. · Use birth control if you do not want to have children at this time. Talk with your doctor about the choices available and what might be best for you. · Protect your skin from too much sun. When you're outdoors from 10 a.m. to 4 p.m., stay in the shade or cover up with clothing and a hat with a wide brim. Wear sunglasses that block UV rays. Even when it's cloudy, put broad-spectrum sunscreen (SPF 30 or higher) on any exposed skin.   · See a dentist one or two times a year for checkups and to have your teeth cleaned. · Wear a seat belt in the car. Follow your doctor's advice about when to have certain tests. These tests can spot problems early. For everyone  · Cholesterol. Have the fat (cholesterol) in your blood tested after age 21. Your doctor will tell you how often to have this done based on your age, family history, or other things that can increase your risk for heart disease. · Blood pressure. Have your blood pressure checked during a routine doctor visit. Your doctor will tell you how often to check your blood pressure based on your age, your blood pressure results, and other factors. · Vision. Talk with your doctor about how often to have a glaucoma test.  · Diabetes. Ask your doctor whether you should have tests for diabetes. · Colon cancer. Your risk for colorectal cancer gets higher as you get older. Some experts say that adults should start regular screening at age 48 and stop at age 76. Others say to start before age 48 or continue after age 76. Talk with your doctor about your risk and when to start and stop screening. For women  · Breast exam and mammogram. Talk to your doctor about when you should have a clinical breast exam and a mammogram. Medical experts differ on whether and how often women under 50 should have these tests. Your doctor can help you decide what is right for you. · Cervical cancer screening test and pelvic exam. Begin with a Pap test at age 24. The test often is part of a pelvic exam. Starting at age [address] [address], you may choose to have a Pap test, an HPV test, or both tests at the same time (called co-testing). Talk with your doctor about how often to have testing. · Tests for sexually transmitted infections (STIs). Ask whether you should have tests for STIs. You may be at risk if you have sex with more than one person, especially if your partners do not wear condoms.   For men  · Tests for sexually transmitted infections (STIs). Ask whether you should have tests for STIs. You may be at risk if you have sex with more than one person, especially if you do not wear a condom. · Testicular cancer exam. Ask your doctor whether you should check your testicles regularly. · Prostate exam. Talk to your doctor about whether you should have a blood test (called a PSA test) for prostate cancer. Experts differ on whether and when men should have this test. Some experts suggest it if you are older than 39 and are -American or have a father or brother who got prostate cancer when he was younger than 72. When should you call for help? Watch closely for changes in your health, and be sure to contact your doctor if you have any problems or symptoms that concern you. Where can you learn more? Go to http://gil-joey.info/. Enter P072 in the search box to learn more about \"Well Visit, Ages 25 to 48: Care Instructions. \"  Current as of: December 13, 2018  Content Version: 12.2  © 9071-4072 [employer]. Care instructions adapted under license by [employer] (which disclaims liability or warranty for this information). If you have questions about a medical condition or this instruction, always ask your healthcare professional. [place] [address] any warranty or liability for your use of this information.

## 2020-01-24 NOTE — PROGRESS NOTES
Subjective:   21 y.o. female for Well Woman Check. No LMP recorded. Patient has had an injection. Social History: single partner, contraception - Depo-Provera injections. Pertinent past medical hstory: no history of HTN, DVT, CAD, DM, liver disease, migraines or smoking. There are no active problems to display for this patient. Current Outpatient Medications   Medication Sig Dispense Refill    medroxyPROGESTERone (DEPO-PROVERA) 150 mg/mL syrg 1 mL by IntraMUSCular route every three (3) months. 1 Syringe 3     No Known Allergies  History reviewed. No pertinent past medical history. History reviewed. No pertinent surgical history. Family History   Problem Relation Age of Onset    No Known Problems Mother     No Known Problems Father     Diabetes Maternal Grandmother      Social History     Tobacco Use    Smoking status: Current Every Day Smoker     Packs/day: 0.50     Years: 3.00     Pack years: 1.50     Types: Cigarettes    Smokeless tobacco: Never Used   Substance Use Topics    Alcohol use: Yes     Alcohol/week: 4.0 standard drinks     Types: 4 Cans of beer per week        ROS:  Feeling well. No dyspnea or chest pain on exertion. No abdominal pain, change in bowel habits, black or bloody stools. No urinary tract symptoms. GYN ROS: no breast pain or new or enlarging lumps on self exam. No neurological complaints. Objective:     Visit Vitals  /70 (BP 1 Location: Left arm, BP Patient Position: Sitting)   Pulse 95   Temp 98.4 °F (36.9 °C) (Oral)   Resp 16   Ht 5' 3\" (1.6 m)   Wt 121 lb (54.9 kg)   SpO2 99%   BMI 21.43 kg/m²     The patient appears well, alert, oriented x 3, in no distress. ENT normal.  Neck supple. No adenopathy or thyromegaly. NIKITA. Lungs are clear, good air entry, no wheezes, rhonchi or rales. S1 and S2 normal, no murmurs, regular rate and rhythm. Abdomen soft without tenderness, guarding, mass or organomegaly. Extremities show no edema, normal peripheral pulses. Neurological is normal, no focal findings. BREAST EXAM: breasts appear normal, no suspicious masses, no skin or nipple changes or axillary nodes    PELVIC EXAM: normal external genitalia, vulva, vagina, cervix, uterus and adnexa    Assessment/Plan:   well woman  pap smear  return annually or prn    ICD-10-CM ICD-9-CM    1. Well woman exam with routine gynecological exam Z01.419 V72.31 PAP IG, APTIMA HPV AND RFX 16/18,45 ([id number])   . HISTORY OF PRESENT ILLNESS  Emperatriz Brewer is a 21 y.o. female. HPI    ROS    Physical Exam    ASSESSMENT and PLAN    ICD-10-CM ICD-9-CM    1. Well woman exam with routine gynecological exam Z01.419 V72.31 PAP IG, APTIMA HPV AND RFX 16/18,45 ([id number])     Will notify when pap smear returns    Pt informed to return to office with worsening of symptoms, or PRN with any questions or concerns. Pt verbalizes understanding of plan of care and denies further questions or concerns at this time.

## 2020-01-27 ENCOUNTER — TELEPHONE (OUTPATIENT)
Dept: FAMILY MEDICINE CLINIC | Age: 25
End: 2020-01-27

## 2020-01-31 NOTE — PROGRESS NOTES
Please call patient and let her know that her pap returned. Negative, but HPV positive. Due to age, should return for pap smear in 3 years. Thanks!

## 2020-02-13 DIAGNOSIS — N92.6 IRREGULAR MENSES: ICD-10-CM

## 2020-02-17 RX ORDER — MEDROXYPROGESTERONE ACETATE 150 MG/ML
INJECTION, SUSPENSION INTRAMUSCULAR
Qty: 1 ML | Refills: 0 | Status: SHIPPED | OUTPATIENT
Start: 2020-02-17 | End: 2020-05-06

## 2020-03-23 ENCOUNTER — HOSPITAL ENCOUNTER (OUTPATIENT)
Dept: LAB | Age: 25
Discharge: HOME | End: 2020-03-23

## 2020-03-23 ENCOUNTER — OFFICE VISIT (OUTPATIENT)
Dept: FAMILY MEDICINE CLINIC | Age: 25
End: 2020-03-23

## 2020-03-23 VITALS
HEART RATE: 93 BPM | BODY MASS INDEX: 20.55 KG/M2 | OXYGEN SATURATION: 99 % | HEIGHT: 63 IN | TEMPERATURE: 97.7 F | DIASTOLIC BLOOD PRESSURE: 64 MMHG | RESPIRATION RATE: 18 BRPM | WEIGHT: 116 LBS | SYSTOLIC BLOOD PRESSURE: 108 MMHG

## 2020-03-23 DIAGNOSIS — Z11.3 SCREENING FOR STD (SEXUALLY TRANSMITTED DISEASE): ICD-10-CM

## 2020-03-23 DIAGNOSIS — Z11.3 SCREENING FOR STD (SEXUALLY TRANSMITTED DISEASE): Primary | ICD-10-CM

## 2020-03-23 LAB
HIV 1+2 AB+HIV1 P24 AG SERPL QL IA: NONREACTIVE
HIV12 RESULT COMMENT, HHIVC: NORMAL

## 2020-03-23 NOTE — PROGRESS NOTES
Identified pt with two pt identifiers(name and ). Chief Complaint   Patient presents with    Exposure to STD        Health Maintenance Due   Topic    Pneumococcal 0-64 years (1 of 1 - PPSV23)    Influenza Age 5 to Adult        Wt Readings from Last 3 Encounters:   20 116 lb (52.6 kg)   20 121 lb (54.9 kg)   20 121 lb (54.9 kg)     Temp Readings from Last 3 Encounters:   20 97.7 °F (36.5 °C) (Oral)   20 98.4 °F (36.9 °C) (Oral)   19 99 °F (37.2 °C) (Oral)     BP Readings from Last 3 Encounters:   20 108/64   20 124/70   20 120/70     Pulse Readings from Last 3 Encounters:   20 93   20 95   20 88         Learning Assessment:  :     Learning Assessment 2016   PRIMARY LEARNER Patient Patient   HIGHEST LEVEL OF EDUCATION - PRIMARY LEARNER  SOME COLLEGE -   BARRIERS PRIMARY LEARNER NONE -   CO-LEARNER CAREGIVER No -   PRIMARY LANGUAGE ENGLISH ENGLISH   LEARNER PREFERENCE PRIMARY DEMONSTRATION DEMONSTRATION     LISTENING -     READING -   ANSWERED BY patient pt   RELATIONSHIP SELF SELF       Depression Screening:  :     3 most recent PHQ Screens 2020   Little interest or pleasure in doing things Not at all   Feeling down, depressed, irritable, or hopeless Not at all   Total Score PHQ 2 0       Fall Risk Assessment:  :     No flowsheet data found. Abuse Screening:  :     Abuse Screening Questionnaire 2020   Do you ever feel afraid of your partner? N N   Are you in a relationship with someone who physically or mentally threatens you? N N   Is it safe for you to go home?  Y Y       Coordination of Care Questionnaire:  :     1) Have you been to an emergency room, urgent care clinic since your last visit? no   Hospitalized since your last visit? no             2) Have you seen or consulted any other health care providers outside of [address] [place] [address] since your last visit? no  (Include any pap smears or colon screenings in this section.)    3) Do you have an Advance Directive on file? no  Are you interested in receiving information about Advance Directives? no    Patient is accompanied by her young daughter. I have received verbal consent from Maddison Meehan to discuss any/all medical information while they are present in the room. Reviewed record in preparation for visit and have obtained necessary documentation. Medication reconciliation up to date and corrected with patient at this time.

## 2020-03-23 NOTE — PROGRESS NOTES
HISTORY OF PRESENT ILLNESS  Fer Zuniga is a 21 y.o. female. HPI  Pt presents with \"wanting STD testing\"    Pt states that her significant other tested positive for HSV 1, and she would like full panel of STD testing  She denies any symptoms  She would like blood and vaginal swab at this time. Review of Systems   Constitutional: Negative for fever. HENT: Negative for congestion. Gastrointestinal: Negative for diarrhea and vomiting. Physical Exam  Constitutional:       Appearance: Normal appearance. HENT:      Head: Normocephalic and atraumatic. Right Ear: Tympanic membrane normal.   Neck:      Musculoskeletal: Normal range of motion and neck supple. Cardiovascular:      Rate and Rhythm: Normal rate and regular rhythm. Heart sounds: Normal heart sounds. Pulmonary:      Effort: Pulmonary effort is normal.      Breath sounds: Normal breath sounds. Neurological:      Mental Status: She is alert. Psychiatric:         Mood and Affect: Mood normal.         Behavior: Behavior normal.         ASSESSMENT and PLAN    ICD-10-CM ICD-9-CM    1. Screening for STD (sexually transmitted disease) Z11.3 V74.5 NUSWAB VAGINITIS PLUS      HSV 1/2 AB, IGG/IGM      HIV 1/2 AG/AB, 4TH GENERATION,W RFLX CONFIRM      RPR     Will notify when labs return    Pt informed to return to office with worsening of symptoms, or PRN with any questions or concerns. Pt verbalizes understanding of plan of care and denies further questions or concerns at this time.

## 2020-03-23 NOTE — PATIENT INSTRUCTIONS
Exposure to Sexually Transmitted Infections: Care Instructions  Your Care Instructions  Sexually transmitted infections (STIs) are those diseases spread by sexual contact. There are at least 20 different STIs, including chlamydia, gonorrhea, syphilis, and human immunodeficiency virus (HIV), which causes AIDS. Bacteria-caused STIs can be treated and cured. STIs caused by viruses, such as HIV, can be treated but not cured. Some STIs can reduce a woman's chances of getting pregnant in the future. STIs are spread during sexual contact, such as vaginal intercourse and oral or anal sex. Follow-up care is a key part of your treatment and safety. Be sure to make and go to all appointments, and call your doctor if you are having problems. It's also a good idea to know your test results and keep a list of the medicines you take. How can you care for yourself at home? · Your doctor may have given you a shot of antibiotics. If your doctor prescribed antibiotic pills, take them as directed. Do not stop taking them just because you feel better. You need to take the full course of antibiotics. · Do not have sexual contact while you have symptoms of an STI or are being treated for an STI. · Tell your sex partner (or partners) that he or she will need treatment. · If you are a woman, do not douche. Douching changes the normal balance of bacteria in the vagina and may spread an infection up into your reproductive organs. To prevent exposure to STIs in the future  · Use latex condoms every time you have sex. Use them from the beginning to the end of sexual contact. · Talk to your partner before you have sex. Find out if he or she has or is at risk for any STI. Keep in mind that a person may be able to spread an STI even if he or she does not have symptoms. · Do not have sex if you are being treated for an STI. · Do not have sex with anyone who has symptoms of an STI, such as sores on the genitals or mouth.   · Having one sex partner (who does not have STIs and does not have sex with anyone else) is a good way to avoid STIs. When should you call for help? Call your doctor now or seek immediate medical care if:    · You have new pain in your belly or pelvis.     · You have symptoms of a urinary tract infection. These may include:  ? Pain or burning when you urinate. ? A frequent need to urinate without being able to pass much urine. ? Pain in the flank, which is just below the rib cage and above the waist on either side of the back. ? Blood in your urine. ? A fever.     · You have new or worsening pain or swelling in the scrotum.    Watch closely for changes in your health, and be sure to contact your doctor if:    · You have unusual vaginal bleeding.     · You have a discharge from the vagina or penis.     · You have any new symptoms, such as sores, bumps, rashes, blisters, or warts.     · You have itching, tingling, pain, or burning in the genital or anal area.     · You think you may have an STI. Where can you learn more? Go to http://gil-joey.info/  Enter M049 in the search box to learn more about \"Exposure to Sexually Transmitted Infections: Care Instructions. \"  Current as of: July 7, 2019Content Version: 12.4  © 5825-3290 Healthwise, Incorporated. Care instructions adapted under license by [employer] (which disclaims liability or warranty for this information). If you have questions about a medical condition or this instruction, always ask your healthcare professional. [address] any warranty or liability for your use of this information.

## 2020-03-24 LAB
HSV1 IGG SER IA-ACNC: <0.91 INDEX (ref 0–0.9)
HSV1+2 IGM SER IA-ACNC: <0.91 RATIO (ref 0–0.9)
HSV2 IGG SER IA-ACNC: <0.91 INDEX (ref 0–0.9)
RPR SER QL: NONREACTIVE

## 2020-03-24 NOTE — PROGRESS NOTES
Please call patient and let her know that her HIV returned and is negative.   Will notify when other labs return  Thanks

## 2020-03-25 NOTE — PROGRESS NOTES
Please call patient and let her know that her HSV labs returned negative. RPR is also negative.   Will notify when Beto Evans returns  Thanks

## 2020-03-28 LAB
A VAGINAE DNA VAG QL NAA+PROBE: NORMAL SCORE
BVAB2 DNA VAG QL NAA+PROBE: NORMAL SCORE
C ALBICANS DNA VAG QL NAA+PROBE: NEGATIVE
C GLABRATA DNA VAG QL NAA+PROBE: NEGATIVE
C TRACH DNA VAG QL NAA+PROBE: NEGATIVE
MEGA1 DNA VAG QL NAA+PROBE: NORMAL SCORE
N GONORRHOEA DNA VAG QL NAA+PROBE: NEGATIVE
T VAGINALIS DNA VAG QL NAA+PROBE: NEGATIVE

## 2020-05-06 DIAGNOSIS — N92.6 IRREGULAR MENSES: ICD-10-CM

## 2020-05-06 RX ORDER — MEDROXYPROGESTERONE ACETATE 150 MG/ML
INJECTION, SUSPENSION INTRAMUSCULAR
Qty: 1 ML | Refills: 0 | Status: SHIPPED | OUTPATIENT
Start: 2020-05-06 | End: 2020-08-17

## 2020-05-11 ENCOUNTER — TELEPHONE (OUTPATIENT)
Dept: FAMILY MEDICINE CLINIC | Age: 25
End: 2020-05-11

## 2020-05-11 NOTE — TELEPHONE ENCOUNTER
According to the pt's chart refill of depo was sent to [facility] in [facility] on 05/06/20 at 3:15pm. Please advise pt and assist her with scheduling a virtual visit with [facility] for assessment of area on her stomach she is concerned about. I will print the electronic confirmation from [facility] for her depo and refax to pharmacy just in case it was electronically lost in transmission/didn't go through when it was originally sent.      medroxyPROGESTERone (DEPO-PROVERA) 150 mg/mL syrg 1 mL 0 5/6/2020     Sig: INJECT 1 ML INTRAMUSCULARLY EVERY 3 MONTHS    Sent to pharmacy as: medroxyPROGESTERone 150 mg/mL intramuscular syringe (DEPO-PROVERA)    E-Prescribing Status: Receipt confirmed by pharmacy (5/6/2020  3:15 PM EDT)

## 2020-05-11 NOTE — TELEPHONE ENCOUNTER
----- Message from Omid Sher sent at 5/11/2020 10:51 AM EDT -----  Regarding: [place]/Telephone  Contact: [phone]  Caller's first and last name: Pt  Reason for call: Pt stated depo needed to refill and the pharmacy stated they would contact the Dr. and it still isn't filled. Callback required yes/no and why: Yes  Best contact number(s): [phone]  Details to clarify the request: PT also has an asymmetrical mole/freckle on the left side of her stomach that just appeared and just wanted to know what should be done.

## 2020-05-12 ENCOUNTER — HOSPITAL ENCOUNTER (EMERGENCY)
Age: 25
Discharge: HOME | End: 2020-05-12
Attending: EMERGENCY MEDICINE
Payer: MEDICAID

## 2020-05-12 ENCOUNTER — VIRTUAL VISIT (OUTPATIENT)
Dept: FAMILY MEDICINE CLINIC | Age: 25
End: 2020-05-12

## 2020-05-12 ENCOUNTER — APPOINTMENT (OUTPATIENT)
Dept: GENERAL RADIOLOGY | Age: 25
End: 2020-05-12
Attending: NURSE PRACTITIONER
Payer: MEDICAID

## 2020-05-12 VITALS — BODY MASS INDEX: 20.55 KG/M2 | HEIGHT: 63 IN

## 2020-05-12 VITALS
TEMPERATURE: 98.7 F | WEIGHT: 134.04 LBS | HEART RATE: 109 BPM | DIASTOLIC BLOOD PRESSURE: 74 MMHG | OXYGEN SATURATION: 98 % | BODY MASS INDEX: 23.74 KG/M2 | SYSTOLIC BLOOD PRESSURE: 145 MMHG | RESPIRATION RATE: 14 BRPM

## 2020-05-12 DIAGNOSIS — S62.625A DISPLACED FRACTURE OF MIDDLE PHALANX OF LEFT RING FINGER, INITIAL ENCOUNTER FOR CLOSED FRACTURE: Primary | ICD-10-CM

## 2020-05-12 DIAGNOSIS — D22.9 NUMEROUS MOLES: Primary | ICD-10-CM

## 2020-05-12 PROCEDURE — 99282 EMERGENCY DEPT VISIT SF MDM: CPT

## 2020-05-12 PROCEDURE — 73140 X-RAY EXAM OF FINGER(S): CPT

## 2020-05-12 NOTE — ED PROVIDER NOTES
19-year-old female presents today with complaints of pain to the left ring finger. She states on Mother's Day which was 2 days ago was throwing a football and injured her ring finger somehow. She is unable to recall exact mechanism of injury however states the pain to her finger is transient is at the joint spaces. She states today she woke up with swelling and is now concerned that she cannot remove her ring. She reports a mild numbness feeling to the finger. She is attempted ice and ibuprofen prior to ER evaluation with little to no relief of pain or swelling. She states pain is present constantly however it is worse with any type of movement. She denies any previous injury, chronic pain or surgical need to affected area. She denies other arthralgias at this time.            Past Medical History:   Diagnosis Date    Nicotine vapor product user        Past Surgical History:   Procedure Laterality Date    HX APPENDECTOMY           Family History:   Problem Relation Age of Onset    No Known Problems Mother     No Known Problems Father     Diabetes Maternal Grandmother        Social History     Socioeconomic History    Marital status: SINGLE     Spouse name: Not on file    Number of children: Not on file    Years of education: Not on file    Highest education level: Not on file   Occupational History    Not on file   Social Needs    Financial resource strain: Not on file    Food insecurity     Worry: Not on file     Inability: Not on file    Transportation needs     Medical: Not on file     Non-medical: Not on file   Tobacco Use    Smoking status: Former Smoker     Types: Cigarettes     Last attempt to quit: 2019     Years since quittin.4    Smokeless tobacco: Never Used    Tobacco comment: Vapes   Substance and Sexual Activity    Alcohol use: Never     Frequency: Never     Comment: 7 months sober    Drug use: No    Sexual activity: Yes   Lifestyle    Physical activity     Days per week: Not on file     Minutes per session: Not on file    Stress: Not on file   Relationships    Social connections     Talks on phone: Not on file     Gets together: Not on file     Attends Bahai service: Not on file     Active member of club or organization: Not on file     Attends meetings of clubs or organizations: Not on file     Relationship status: Not on file    Intimate partner violence     Fear of current or ex partner: Not on file     Emotionally abused: Not on file     Physically abused: Not on file     Forced sexual activity: Not on file   Other Topics Concern    Not on file   Social History Narrative    Not on file         ALLERGIES: Patient has no known allergies. Review of Systems   Constitutional: Negative for chills and fever. HENT: Negative for congestion, sinus pressure and sore throat. Musculoskeletal: Positive for arthralgias (Left ring finger) and joint swelling (Left ring finger). Skin: Positive for color change (Bruising to left ring finger). Negative for rash and wound. Neurological: Positive for numbness (Slight decrease in sensation to left ring finger). Negative for weakness. Psychiatric/Behavioral: Negative for self-injury. The patient is not nervous/anxious. Vitals:    05/12/20 1555   BP: 145/74   Pulse: (!) 109   Resp: 14   Temp: 98.7 °F (37.1 °C)   SpO2: 98%   Weight: 60.8 kg (134 lb 0.6 oz)            Physical Exam  Vitals signs and nursing note reviewed. Constitutional:       General: She is not in acute distress. Appearance: Normal appearance. Musculoskeletal:      Left hand: She exhibits normal capillary refill. Comments: Left ring finger with diffuse pain worse over the DIP and PIP joints. DIP greater than PIP  Mild active range of motion attempted however passive range of motion intact elicits further pain to the DIP joint.   No pain at the fourth MCP joint  Sensation present however slightly less when compared to other hand  Two-point discrimination intact  Thumb finger opposition intact  No hand, wrist or forearm TTP. No decreased range of motion of wrist  2+ radial pulse palpable. Brisk cap refill noted to finger. Swelling to finger present however worse between PIP and MCP. No bony tenderness at this area   Skin:     General: Skin is warm and dry. Neurological:      General: No focal deficit present. Mental Status: She is alert and oriented to person, place, and time. Psychiatric:         Mood and Affect: Mood normal.         Behavior: Behavior normal.         Thought Content: Thought content normal.         Judgment: Judgment normal.          MDM  Number of Diagnoses or Management Options  Displaced fracture of middle phalanx of left ring finger, initial encounter for closed fracture:   Diagnosis management comments: 67-year-old female presents with left ring finger pain after injury on Mother's Day  X-ray reveals a middle phalanx intra-articular fracture  Ring had to be removed by ring cutter  Aluminum foam splint placed. Follow-up with [facility] discussed. Amount and/or Complexity of Data Reviewed  Tests in the radiology section of CPT®: ordered and reviewed  Discuss the patient with other providers: yes    Risk of Complications, Morbidity, and/or Mortality  Presenting problems: low  Diagnostic procedures: low  Management options: low           Procedures    XR 4TH FINGER LT MIN 2 V (Final result)   Result time 05/12/20 16:24:20   Final result by Asia Gaines MD (05/12/20 16:24:20)                Impression:    IMPRESSION: Acute, mildly displaced intra-articular fracture of the volar base  of the middle phalanx of the left fourth digit            Narrative:    INDICATION: injury. finger swelling. Hand struck with football on Sunday. Exam: AP, lateral, oblique views of the left fourth digit.     FINDINGS: There is an acute, mildly displaced intra-articular fracture of the  volar base of the middle phalanx of the left fourth digit, best visualized on  lateral view. Soft tissue swelling overlies the left fourth PIP joint. No  additional fracture is seen. Bones are well-mineralized.                      Ring removed at this time with ring cutter. Attempted lubrication without success. Ring cutter was successful. When placed in a Ziploc bag and handed to patient    Finger placed in AlumaFoam splint prior to discharge       Verbal discharge instruction given to patient include:      Continue ibuprofen as already taking I recommend taking every 8 hours for 3 days and every 8 hours as needed for pain  Ice to affected area every 8 hours for 20 minutes x 3 days and every 8 hours as needed for pain  Wear AlumaFoam splint for comfort support until follow-up with orthopedic  Call [address] [place] [address] tomorrow for follow-up appointment within 1 week for further evaluation of finger fracture.   Can remove aluminum/foam splint for bathing  No lifting or grabbing with left hand until released by orthopedic specialist  Return to ER for new or worsening concerns

## 2020-05-12 NOTE — PROGRESS NOTES
Identified pt with two pt identifiers(name and ). Chief Complaint   Patient presents with    Skin Problem     \"asymmetrical mole/freckle on the left side of her stomach that just appeared\"        Health Maintenance Due   Topic    Pneumococcal 0-64 years (1 of 1 - PPSV23)       Wt Readings from Last 3 Encounters:   20 116 lb (52.6 kg)   20 121 lb (54.9 kg)   20 121 lb (54.9 kg)     Temp Readings from Last 3 Encounters:   20 97.7 °F (36.5 °C) (Oral)   20 98.4 °F (36.9 °C) (Oral)   19 99 °F (37.2 °C) (Oral)     BP Readings from Last 3 Encounters:   20 108/64   20 124/70   20 120/70     Pulse Readings from Last 3 Encounters:   20 93   20 95   20 88         Learning Assessment:  :     Learning Assessment 2016   PRIMARY LEARNER Patient Patient   HIGHEST LEVEL OF EDUCATION - PRIMARY LEARNER  SOME COLLEGE -   BARRIERS PRIMARY LEARNER NONE -   CO-LEARNER CAREGIVER No -   PRIMARY LANGUAGE ENGLISH ENGLISH   LEARNER PREFERENCE PRIMARY DEMONSTRATION DEMONSTRATION     LISTENING -     READING -   ANSWERED BY patient pt   RELATIONSHIP SELF SELF       Depression Screening:  :     3 most recent PHQ Screens 2020   Little interest or pleasure in doing things Not at all   Feeling down, depressed, irritable, or hopeless Not at all   Total Score PHQ 2 0       Fall Risk Assessment:  :     No flowsheet data found. Abuse Screening:  :     Abuse Screening Questionnaire 2020   Do you ever feel afraid of your partner? N N   Are you in a relationship with someone who physically or mentally threatens you? N N   Is it safe for you to go home?  Y Y       Coordination of Care Questionnaire:  :     1) Have you been to an emergency room, urgent care clinic since your last visit? no   Hospitalized since your last visit? no             2) Have you seen or consulted any other health care providers outside of [address] [place] [address] since your last visit? no  (Include any pap smears or colon screenings in this section.)    3) Do you have an Advance Directive on file? no  Are you interested in receiving information about Advance Directives? No    Reviewed record in preparation for visit and have obtained necessary documentation. Medication reconciliation up to date and corrected with patient at this time.

## 2020-05-12 NOTE — ED TRIAGE NOTES
Pt's hand struck with football on Sunday. Left 4th digit swollen and ring is stuck on.   Patient's finger is painful

## 2020-05-12 NOTE — PROGRESS NOTES
HISTORY OF PRESENT ILLNESS  Todd Burnett is a 21 y.o. female. HPI   Pt presents with \"changing mole\"  Visit was conducted via [employer], [employer]. me  Pt was located at home, provider was located at [facility]  Visit lasted 3 minutes  Consent: Todd Burnett, who was seen by synchronous (real-time) audio-video technology, and/or her healthcare decision maker, is aware that this patient-initiated, Telehealth encounter on 5/12/2020 is a billable service, with coverage as determined by her insurance carrier. She is aware that she may receive a bill and has provided verbal consent to proceed: Yes. Pt states that a week ago she noticed a new and changing mole on her stomach  It is not perfectly round, and has some differentiation in color to it  She does have skin cancer history in her immediate family  No pain or itching from the mole  Review of Systems   Constitutional: Negative for fever. Physical Exam  Constitutional:       Appearance: Normal appearance. Neurological:      Mental Status: She is alert. Psychiatric:         Mood and Affect: Mood normal.         Behavior: Behavior normal.         ASSESSMENT and PLAN    ICD-10-CM ICD-9-CM    1. Numerous moles D22.9 216.9 REFERRAL TO DERMATOLOGY     Educated about calling dermatology for an appointment ASAP    Pt informed to return to office with worsening of symptoms, or PRN with any questions or concerns. Pt verbalizes understanding of plan of care and denies further questions or concerns at this time.

## 2020-05-12 NOTE — DISCHARGE INSTRUCTIONS
Patient Education      Continue ibuprofen as already taking I recommend taking every 8 hours for 3 days and every 8 hours as needed for pain  Ice to affected area every 8 hours for 20 minutes x 3 days and every 8 hours as needed for pain  Wear AlumaFoam splint for comfort support until follow-up with orthopedic  Call [facility] tomorrow for follow-up appointment within 1 week for further evaluation of finger fracture. Can remove aluminum/foam splint for bathing  No lifting or grabbing with left hand until released by orthopedic specialist  Return to ER for new or worsening concerns    Finger Fracture: Care Instructions  Your Care Instructions    Breaks in the bones of the finger usually heal well in about 3 to 4 weeks. The pain and swelling from a broken finger can last for weeks. But it should steadily improve, starting a few days after you break it. It is very important that you wear and take care of the cast or splint exactly as your doctor tells you to so that your finger heals properly and does not end up crooked. Wearing a splint may interfere with your normal activities. Ask for help with daily tasks if you need it. You heal best when you take good care of yourself. Eat a variety of healthy foods, and don't smoke. Follow-up care is a key part of your treatment and safety. Be sure to make and go to all appointments, and call your doctor if you are having problems. It's also a good idea to know your test results and keep a list of the medicines you take. How can you care for yourself at home? · If your doctor put a splint on your finger, wear the splint exactly as directed. Do not remove it until your doctor says that you can. · Keep your hand raised above the level of your heart as much as you can. This will help reduce swelling. · Put ice or a cold pack on your finger for 10 to 20 minutes at a time.  Try to do this every 1 to 2 hours for the next 3 days (when you are awake) or until the swelling goes down. Put a thin cloth between the ice and your skin. Keep the splint dry. · Be safe with medicines. Take pain medicines exactly as directed. ? If the doctor gave you a prescription medicine for pain, take it as prescribed. ? If you are not taking a prescription pain medicine, ask your doctor if you can take an over-the-counter medicine. When should you call for help? Call 911 anytime you think you may need emergency care. For example, call if:    · Your finger is cool or pale or changes color.    Call your doctor now or seek immediate medical care if:    · Your pain gets much worse.     · You have tingling, weakness, or numbness in your finger.     · You have signs of infection, such as:  ? Increased pain, swelling, warmth, or redness. ? Red streaks leading from the area. ? Pus draining from the area. ? Swollen lymph nodes in your neck, armpits, or groin. ? A fever.    Watch closely for changes in your health, and be sure to contact your doctor if:    · Your finger is not steadily improving. Where can you learn more? Go to http://gil-joey.info/  Enter I960 in the search box to learn more about \"Finger Fracture: Care Instructions. \"  Current as of: June 26, 2019Content Version: 12.4  © 6764-7978 Healthwise, Incorporated. Care instructions adapted under license by [employer] (which disclaims liability or warranty for this information). If you have questions about a medical condition or this instruction, always ask your healthcare professional. [place] [address] any warranty or liability for your use of this information.

## 2020-08-17 DIAGNOSIS — N92.6 IRREGULAR MENSES: ICD-10-CM

## 2020-08-17 RX ORDER — MEDROXYPROGESTERONE ACETATE 150 MG/ML
INJECTION, SUSPENSION INTRAMUSCULAR
Qty: 1 ML | Refills: 0 | Status: SHIPPED | OUTPATIENT
Start: 2020-08-17 | End: 2020-11-13

## 2020-10-13 ENCOUNTER — VIRTUAL VISIT (OUTPATIENT)
Dept: FAMILY MEDICINE CLINIC | Age: 25
End: 2020-10-13
Payer: MEDICAID

## 2020-10-13 DIAGNOSIS — R10.84 GENERALIZED ABDOMINAL PAIN: Primary | ICD-10-CM

## 2020-10-13 PROCEDURE — 99213 OFFICE O/P EST LOW 20 MIN: CPT | Performed by: NURSE PRACTITIONER

## 2020-10-13 NOTE — PROGRESS NOTES
HISTORY OF PRESENT ILLNESS  Karina Najera is a 31 y.o. female. HPI  Pt presents with \"stomach pain\"  Visit was conducted via [employer]. me  Pt was located at home, provider was located at [facility]  Visit lasted 3 minutes  Karina Najera, who was evaluated through a synchronous (real-time) audio-video encounter, and/or her healthcare decision maker, is aware that it is a billable service, with coverage as determined by her insurance carrier. She provided verbal consent to proceed: Yes, and patient identification was verified. It was conducted pursuant to the emergency declaration under the [facility], [address] [place] [address] authority and the [employer] Act. A caregiver was present when appropriate. Ability to conduct physical exam was limited. I was in the office. The patient was at home. Pt states that after eating fried foods, she has been getting abdominal pain  She notes that the pain will be hours after eating  She states that she has a hard time saying where the pain is located  She notes that she will be nauseated when she has the pain  Diarrhea will be present with pain as well  No vomiting  No pain is noted at this time    Pt states that all the women in her family have had to have their gallbladders removed, and she is wondering if this is what could be causing her pain? Review of Systems   Constitutional: Negative for fever. Gastrointestinal: Positive for abdominal pain and diarrhea. Physical Exam  Constitutional:       Appearance: Normal appearance. Pulmonary:      Effort: Pulmonary effort is normal.   Neurological:      Mental Status: She is alert. Psychiatric:         Mood and Affect: Mood normal.         Behavior: Behavior normal.         ASSESSMENT and PLAN    ICD-10-CM ICD-9-CM    1.  Generalized abdominal pain  R10.84 789.07 US ABD LTD     Will notify when US returns    Pt informed to return to office with worsening of symptoms, or PRN with any questions or concerns. Pt verbalizes understanding of plan of care and denies further questions or concerns at this time.

## 2020-10-26 ENCOUNTER — HOSPITAL ENCOUNTER (OUTPATIENT)
Dept: ULTRASOUND IMAGING | Age: 25
Discharge: HOME | End: 2020-10-26
Attending: NURSE PRACTITIONER
Payer: MEDICAID

## 2020-10-26 DIAGNOSIS — R10.84 GENERALIZED ABDOMINAL PAIN: ICD-10-CM

## 2020-10-26 PROCEDURE — 76705 ECHO EXAM OF ABDOMEN: CPT

## 2020-10-26 NOTE — PROGRESS NOTES
Please call patient and let her know that her [address] returned and is stable. No signs of gallbladder disease.   Should pain continue, should be seen by GI  Thanks

## 2020-11-12 DIAGNOSIS — N92.6 IRREGULAR MENSES: ICD-10-CM

## 2020-11-13 ENCOUNTER — TELEPHONE (OUTPATIENT)
Dept: FAMILY MEDICINE CLINIC | Age: 25
End: 2020-11-13

## 2020-11-13 RX ORDER — MEDROXYPROGESTERONE ACETATE 150 MG/ML
INJECTION, SUSPENSION INTRAMUSCULAR
Qty: 1 ML | Refills: 0 | Status: SHIPPED | OUTPATIENT
Start: 2020-11-13 | End: 2021-01-20

## 2020-11-13 NOTE — TELEPHONE ENCOUNTER
Baltazar Falling  P [facility] Office Pool               Medication Refill     Caller (if not patient):n/a       Relationship of caller (if not patient):n/a       Best contact number(s):[phone]       Name of medication and dosage if known: \"depoprovera\"       Is patient out of this medication (yes/no): yes       Pharmacy name: [address] [place] [address] Dr listed in chart? (yes/no): yes   Pharmacy phone number:n/a       Details to clarify the request: Pt is requesting a 3 injection refill.

## 2021-01-20 DIAGNOSIS — N92.6 IRREGULAR MENSES: ICD-10-CM

## 2021-01-20 RX ORDER — MEDROXYPROGESTERONE ACETATE 150 MG/ML
INJECTION, SUSPENSION INTRAMUSCULAR
Qty: 1 ML | Refills: 0 | Status: SHIPPED | OUTPATIENT
Start: 2021-01-20

## 2021-02-26 ENCOUNTER — OFFICE VISIT (OUTPATIENT)
Dept: FAMILY MEDICINE CLINIC | Age: 26
End: 2021-02-26
Payer: MEDICAID

## 2021-02-26 VITALS
HEIGHT: 63 IN | HEART RATE: 94 BPM | RESPIRATION RATE: 16 BRPM | BODY MASS INDEX: 21.62 KG/M2 | WEIGHT: 122 LBS | DIASTOLIC BLOOD PRESSURE: 76 MMHG | OXYGEN SATURATION: 100 % | SYSTOLIC BLOOD PRESSURE: 114 MMHG | TEMPERATURE: 98.6 F

## 2021-02-26 DIAGNOSIS — R59.0 INGUINAL LYMPHADENOPATHY: ICD-10-CM

## 2021-02-26 DIAGNOSIS — N93.9 VAGINA BLEEDING: Primary | ICD-10-CM

## 2021-02-26 PROCEDURE — 99213 OFFICE O/P EST LOW 20 MIN: CPT | Performed by: NURSE PRACTITIONER

## 2021-02-26 RX ORDER — DULOXETIN HYDROCHLORIDE 30 MG/1
30 CAPSULE, DELAYED RELEASE ORAL
COMMUNITY
Start: 2021-02-05

## 2021-02-26 RX ORDER — HYDROXYZINE HYDROCHLORIDE 10 MG/1
1 TABLET, FILM COATED ORAL
COMMUNITY
Start: 2021-02-05

## 2021-02-26 NOTE — PATIENT INSTRUCTIONS
Vaginal Bleeding in Nonpregnant Women: Care Instructions Your Care Instructions Many women have bleeding or spotting between periods. Lots of things can cause it. You may bleed because of hormone problems, stress, or ovulation. Fibroids and IUDs (intrauterine devices) can also cause bleeding. If your bleeding or spotting is caused by one of these things and is not heavy or doesn't happen often, you probably don't need to worry. But in rare cases, infection, cancer, or other serious conditions can cause bleeding. So you may need more tests to find the cause of your bleeding. The doctor has checked you carefully, but problems can develop later. If you notice any problems or new symptoms, get medical treatment right away. Follow-up care is a key part of your treatment and safety. Be sure to make and go to all appointments, and call your doctor if you are having problems. It's also a good idea to know your test results and keep a list of the medicines you take. How can you care for yourself at home? · Take pain medicines exactly as directed. ? If the doctor gave you a prescription medicine for pain, take it as prescribed. ? If you are not taking a prescription pain medicine, ask your doctor if you can take an over-the-counter medicine. Do not take aspirin, which may make bleeding worse. · If your doctor prescribed birth control pills for your bleeding, take them as directed. · Eat foods that are high in iron and vitamin C. Foods high in iron include red meat, shellfish, eggs, beans, and leafy green vegetables. Foods high in vitamin C include citrus fruits, tomatoes, and broccoli. Ask your doctor if you need to take iron pills or a multivitamin. · Ask your doctor when it is okay to have sex. When should you call for help? Call 911 anytime you think you may need emergency care. For example, call if: 
  · You passed out (lost consciousness). Call your doctor now or seek immediate medical care if:   · You have severe vaginal bleeding.  
  · You are dizzy or lightheaded, or you feel like you may faint.  
  · You have new or worse belly or pelvic pain. Watch closely for changes in your health, and be sure to contact your doctor if: 
  · Your bleeding gets worse.  
  · You think you might be pregnant.  
  · You do not get better as expected. Where can you learn more? Go to http://www.gray.com/ Enter C883 in the search box to learn more about \"Vaginal Bleeding in Nonpregnant Women: Care Instructions. \" Current as of: November 8, 2019               Content Version: 12.6 © 0526-5238 [employer]. Care instructions adapted under license by [employer] (which disclaims liability or warranty for this information). If you have questions about a medical condition or this instruction, always ask your healthcare professional. [address] any warranty or liability for your use of this information.

## 2021-02-26 NOTE — PROGRESS NOTES
Identified pt with two pt identifiers(name and ).    Chief Complaint   Patient presents with   • Cyst     \"My right side there has been a knot that has been present approx 2 months\"   • Vaginal Bleeding     on depo x 10 years and is all of the sudden bleeding over the past two weeks - has had some large clots and is concerned she may be pregnant        Health Maintenance Due   Topic   • Hepatitis C Screening    • Flu Vaccine (1)   • DTaP/Tdap/Td series (2 - Td)       Wt Readings from Last 3 Encounters:   21 122 lb (55.3 kg)   20 134 lb 0.6 oz (60.8 kg)   20 116 lb (52.6 kg)     Temp Readings from Last 3 Encounters:   21 98.6 °F (37 °C) (Oral)   20 98.7 °F (37.1 °C)   20 97.7 °F (36.5 °C) (Oral)     BP Readings from Last 3 Encounters:   21 114/76   20 145/74   20 108/64     Pulse Readings from Last 3 Encounters:   21 94   20 (!) 109   20 93         Learning Assessment:  :     Learning Assessment 2016   PRIMARY LEARNER Patient Patient   HIGHEST LEVEL OF EDUCATION - PRIMARY LEARNER  SOME COLLEGE -   BARRIERS PRIMARY LEARNER NONE -   CO-LEARNER CAREGIVER No -   PRIMARY LANGUAGE ENGLISH ENGLISH   LEARNER PREFERENCE PRIMARY DEMONSTRATION DEMONSTRATION     LISTENING -     READING -   ANSWERED BY patient pt   RELATIONSHIP SELF SELF       Depression Screening:  :     3 most recent PHQ Screens 2021   Little interest or pleasure in doing things Not at all   Feeling down, depressed, irritable, or hopeless Not at all   Total Score PHQ 2 0       Fall Risk Assessment:  :     No flowsheet data found.    Abuse Screening:  :     Abuse Screening Questionnaire 2021   Do you ever feel afraid of your partner? N N N   Are you in a relationship with someone who physically or mentally threatens you? N N N   Is it safe for you to go home? Y Y Y       Coordination of Care Questionnaire:  :     1) Have you been to an emergency  room, urgent care clinic since your last visit? no   Hospitalized since your last visit? no             2) Have you seen or consulted any other health care providers outside of [address] [place] [address] since your last visit? no  (Include any pap smears or colon screenings in this section.)    3) Do you have an Advance Directive on file? no  Are you interested in receiving information about Advance Directives? no    Patient is accompanied by self. Reviewed record in preparation for visit and have obtained necessary documentation.   Medication reconciliation up to date and corrected with patient at this time.   '

## 2021-02-26 NOTE — PROGRESS NOTES
HISTORY OF PRESENT ILLNESS Elroy Sorensen is a 31 y.o. female. HPI 
 
ROS Physical Exam 
 
ASSESSMENT and PLAN 
{ASSESSMENT/PLAN:76015}

## 2021-02-26 NOTE — PROGRESS NOTES
HISTORY OF PRESENT ILLNESS  Jose Wiseman is a 31 y.o. female. HPI   Pt presents with \"vaginal bleeding\"  Pt states that she has had vaginal bleeding for the past 2 weeks  At times, she does pass clots  She has been on depo for 10 years, and states that she has never had vaginal bleeding while on depo  She is not wanting to be pregnant currently  She has not taken UPT at home    In addition, she notes a \"knot\" in her right inner groin. She states that it has been there for about 2 months  There is no pain int he area  It has not grown in size  Review of Systems   Constitutional: Negative for fever. Physical Exam  Constitutional:       Appearance: Normal appearance. Cardiovascular:      Rate and Rhythm: Normal rate and regular rhythm. Heart sounds: Normal heart sounds. Pulmonary:      Effort: Pulmonary effort is normal.      Breath sounds: Normal breath sounds. Genitourinary:      Lymphadenopathy:      Lower Body: Right inguinal adenopathy present. Neurological:      Mental Status: She is alert. Psychiatric:         Mood and Affect: Mood normal.         Behavior: Behavior normal.         ASSESSMENT and PLAN    ICD-10-CM ICD-9-CM    1. Vagina bleeding  N93.9 623.8 REFERRAL TO OBSTETRICS AND GYNECOLOGY     Educated about calling OBGYN for an appointment at this time  Should be assessed for vaginal bleeding, etc.    Pt informed to return to office with worsening of symptoms, or PRN with any questions or concerns. Pt verbalizes understanding of plan of care and denies further questions or concerns at this time.

## 2021-03-10 NOTE — PROGRESS NOTES
Abnormal bleeding note      Bailey Hannon is a 31 y.o. female who complains of vaginal bleeding problems. Her current method of family planning is Depo-Provera injections. Her last injection was in late January. She has been on depo provera for 10 years. She has never had any vaginal bleeding until now. She developed this problem approximately 1 month ago. She has had vaginal bleeding which she describes as moderate with clotting sometimes that has been ongoing for a month. States she has been on Depo for 10 years and has never had bleeding before. Associated symptoms include fatigue, within the last few days. She has not been late with any injections. Alleviating factors: none    Aggravating factors: none      The patient is sexually active. Last Pap smear: was abnormal: + HPV, on 20. She also has noticed a pea sized mass in her right groin for the past 2-3 months. Not painful. Her relevant past medical history:   Past Medical History:   Diagnosis Date    Nicotine vapor product user         Past Surgical History:   Procedure Laterality Date    HX APPENDECTOMY       Social History     Occupational History    Not on file   Tobacco Use    Smoking status: Former Smoker     Types: Cigarettes     Quit date: 2019     Years since quittin.3    Smokeless tobacco: Current User    Tobacco comment: Vapes   Substance and Sexual Activity    Alcohol use: Never     Frequency: Never     Comment: 7 months sober    Drug use: No    Sexual activity: Yes     Birth control/protection: Injection     Family History   Problem Relation Age of Onset    No Known Problems Mother     No Known Problems Father     Diabetes Maternal Grandmother        No Known Allergies  Prior to Admission medications    Medication Sig Start Date End Date Taking?  Authorizing Provider   medroxyPROGESTERone (DEPO-PROVERA) 150 mg/mL syrg INJECT 1ML INTRAMUSCULARLY EVERY 3 MONTHS 21  Yes Konstantin Lombardo NP DULoxetine (CYMBALTA) 30 mg capsule Take 30 mg by mouth once over twenty-four (24) hours. 2/5/21   Provider, Historical   hydrOXYzine HCL (ATARAX) 10 mg tablet Take 1 Tab by mouth.  1-2 daily po prn 2/5/21   Provider, Historical        Review of Systems - History obtained from the patient  Constitutional: negative for weight loss, fever, night sweats  HEENT: negative for hearing loss, earache, congestion, snoring, sorethroat  CV: negative for chest pain, palpitations, edema  Resp: negative for cough, shortness of breath, wheezing  Breast: negative for breast lumps, nipple discharge, galactorrhea  GI: negative for change in bowel habits, abdominal pain, black or bloody stools  : negative for frequency, dysuria, hematuria  MSK: negative for back pain, joint pain, muscle pain  Skin: negative for itching, rash, hives  Neuro: negative for dizziness, headache, confusion, weakness  Psych: negative for anxiety, depression, change in mood  Heme/lymph: negative for bleeding, bruising, pallor      Objective:    Visit Vitals  /62   Ht 5' 3\" (1.6 m)   Wt 123 lb (55.8 kg)   LMP 02/18/2021   BMI 21.79 kg/m²          PHYSICAL EXAMINATION    Constitutional  · Appearance: well-nourished, well developed, alert, in no acute distress    HENT  · Head and Face: appears normal    Neck  · Inspection/Palpation: normal appearance, no masses or tenderness  · Lymph Nodes: no lymphadenopathy present  · Thyroid: gland size normal, nontender, no nodules or masses present on palpation  ·     Gastrointestinal  · Abdominal Examination: abdomen non-tender to palpation, normal bowel sounds, no masses present  · Liver and spleen: no hepatomegaly present, spleen not palpable  · Hernias: no hernias identified    Genitourinary  · External Genitalia: normal appearance for age, no discharge present, no tenderness present, no inflammatory lesions present, no masses present, no atrophy present  · Vagina: normal vaginal vault without central or paravaginal defects, no discharge present, no inflammatory lesions present, no masses present  · Bladder: non-tender to palpation  · Urethra: appears normal  · Cervix: normal   · Uterus: normal size, shape and consistency  · Adnexa: no adnexal tenderness present, no adnexal masses present  · Perineum: perineum within normal limits, no evidence of trauma, no rashes or skin lesions present  · Anus: anus within normal limits, no hemorrhoids present  · Inguinal Lymph Nodes:  1 cm right non tender lymph node    Skin  · General Inspection: no rash, no lesions identified    Neurologic/Psychiatric  · Mental Status:  · Orientation: grossly oriented to person, place and time  · Mood and Affect: mood normal, affect appropriate    Assessment:   irregular bleeding likely due to long use of depo provera  Right inguinal lymph node    Plan:   RTO for and US to r/o polyps  She will then likely switch to OCPs. F/U with Heme/Onc or general surgery re the right lymph node         Instructions given to pt. Handouts given to pt.

## 2021-03-12 ENCOUNTER — OFFICE VISIT (OUTPATIENT)
Dept: OBGYN CLINIC | Age: 26
End: 2021-03-12
Payer: MEDICAID

## 2021-03-12 VITALS
WEIGHT: 123 LBS | SYSTOLIC BLOOD PRESSURE: 104 MMHG | BODY MASS INDEX: 21.79 KG/M2 | HEIGHT: 63 IN | DIASTOLIC BLOOD PRESSURE: 62 MMHG

## 2021-03-12 DIAGNOSIS — N93.8 DUB (DYSFUNCTIONAL UTERINE BLEEDING): Primary | ICD-10-CM

## 2021-03-12 PROCEDURE — 99203 OFFICE O/P NEW LOW 30 MIN: CPT | Performed by: OBSTETRICS & GYNECOLOGY

## 2021-03-29 ENCOUNTER — OFFICE VISIT (OUTPATIENT)
Dept: OBGYN CLINIC | Age: 26
End: 2021-03-29

## 2021-03-29 VITALS
SYSTOLIC BLOOD PRESSURE: 114 MMHG | DIASTOLIC BLOOD PRESSURE: 60 MMHG | HEIGHT: 63 IN | BODY MASS INDEX: 21.79 KG/M2 | WEIGHT: 123 LBS

## 2021-03-29 DIAGNOSIS — N93.8 DUB (DYSFUNCTIONAL UTERINE BLEEDING): Primary | ICD-10-CM

## 2021-03-29 DIAGNOSIS — Z30.011 ENCOUNTER FOR INITIAL PRESCRIPTION OF CONTRACEPTIVE PILLS: ICD-10-CM

## 2021-03-29 PROCEDURE — 99213 OFFICE O/P EST LOW 20 MIN: CPT | Performed by: OBSTETRICS & GYNECOLOGY

## 2021-03-29 RX ORDER — LEVONORGESTREL AND ETHINYL ESTRADIOL 0.15-0.03
1 KIT ORAL DAILY
Qty: 1 DOSE PACK | Refills: 1 | Status: SHIPPED | OUTPATIENT
Start: 2021-03-29

## 2021-03-29 NOTE — PROGRESS NOTES
Ultrasound followup    Sonu Gray is a 31 y.o. female is here today to review the results of her ultrasound evaluation. Her U/S evaluation is performed because of a previous encounter revealing abnormal uterine bleeding which was identified 3 weeks ago. She is here for a(n) initial ultrasound study. The sonogram results are:    TRANSVAGINAL ULTRASOUND PERFORMED  UTERUS IS ANTEVERTED, NORMAL IN SIZE AND ECHOGENICITY. ENDOMETRIUM MEASURES 4MM IN THICKNESS. FLUID IS SEEN WITHIN THE ENDOMETRIUM. THE ENDOMETRIAL  WALLS APPEAR IRREGULAR. RIGHT OVARY APPEARS WITHIN NORMAL LIMITS. LEFT OVARY APPEARS WITHIN NORMAL LIMITS. A FOLLICULAR CYST IS SEEN. FREE FLUID SEEN IN THE CDS. See detailed report for more information. No obvious polyps or masses. She wants to switch to extended cycle OCPs. Given script for Tami Mack. Total face-to-face time with the patient was 12 minutes, and over half of this time was spent in patient counseling.

## 2021-09-08 ENCOUNTER — TELEPHONE (OUTPATIENT)
Dept: OBGYN CLINIC | Age: 26
End: 2021-09-08

## 2021-09-08 RX ORDER — LEVONORGESTREL AND ETHINYL ESTRADIOL 0.15-0.03
1 KIT ORAL DAILY
Qty: 1 DOSE PACK | Refills: 1 | OUTPATIENT
Start: 2021-09-08

## 2021-09-08 NOTE — TELEPHONE ENCOUNTER
Message left at 12:44PM      31year old patient last seen in the office on 3/29/2021 for problem visit and starting ocp    Patient left a message for a refill      This nurse called the patient back and advised of need for ae appointment    Patient states she does not need an annual exam and she will ask her pcp to refill her ocp    Patient was advised to call back if she changes her mind    Patient verbalized understanding.

## 2021-09-08 NOTE — TELEPHONE ENCOUNTER
----- Message from [employer] sent at 9/8/2021  1:27 PM EDT -----  Regarding: /refills  Medication Refill    Caller (if not patient):      Relationship of caller (if not patient):      Best contact number(s): [phone]      Name of medication and dosage if known: Birth Control Pills      Is patient out of this medication (yes/no): yes      Pharmacy name: [facility] on [address] listed in chart? (yes/no): yes  Pharmacy phone number: [phone]        Details to clarify the request: Was calling to have birth control refilled with out having to come in office for physical exam.      Mikayla

## 2021-09-10 DIAGNOSIS — N92.6 IRREGULAR MENSES: ICD-10-CM

## 2021-09-10 RX ORDER — MEDROXYPROGESTERONE ACETATE 150 MG/ML
INJECTION, SUSPENSION INTRAMUSCULAR
Qty: 1 ML | Refills: 0
Start: 2021-09-10

## 2021-09-10 NOTE — TELEPHONE ENCOUNTER
Pt wants to know if she makes appt with you will you fill her birth control pills instead of the obgyn?     Call pt at [phone]

## 2022-12-15 ENCOUNTER — OFFICE VISIT (OUTPATIENT)
Dept: FAMILY MEDICINE CLINIC | Age: 27
End: 2022-12-15

## 2022-12-15 VITALS
WEIGHT: 126 LBS | HEIGHT: 63 IN | BODY MASS INDEX: 22.32 KG/M2 | SYSTOLIC BLOOD PRESSURE: 126 MMHG | OXYGEN SATURATION: 98 % | HEART RATE: 102 BPM | TEMPERATURE: 97.8 F | RESPIRATION RATE: 16 BRPM | DIASTOLIC BLOOD PRESSURE: 80 MMHG

## 2022-12-15 DIAGNOSIS — Z34.90 PREGNANCY, UNSPECIFIED GESTATIONAL AGE: Primary | ICD-10-CM

## 2022-12-15 DIAGNOSIS — N92.6 MISSED MENSES: ICD-10-CM

## 2022-12-15 LAB
HCG URINE, QL. (POC): POSITIVE
VALID INTERNAL CONTROL?: YES

## 2022-12-15 PROCEDURE — 81025 URINE PREGNANCY TEST: CPT | Performed by: NURSE PRACTITIONER

## 2022-12-15 PROCEDURE — 99213 OFFICE O/P EST LOW 20 MIN: CPT | Performed by: NURSE PRACTITIONER

## 2022-12-15 NOTE — PROGRESS NOTES
HISTORY OF PRESENT ILLNESS  Jailene Funk is a 28 y.o. female. HPI  Pt presents with \"missed menses\"  Pt states that she stopped her OCP in September  She has been trying to get pregnant  LMP: 11/9/22  She took a pregnancy test 2 days ago, and it was positive  She wanted another test, and needs referral to OBGYN  She is not on any medications right now  She is not taking a prenatal vitamin at this time  Review of Systems   Constitutional:  Negative for fever. Physical Exam  Constitutional:       Appearance: Normal appearance. Cardiovascular:      Rate and Rhythm: Normal rate and regular rhythm. Heart sounds: Normal heart sounds. Pulmonary:      Effort: Pulmonary effort is normal.      Breath sounds: Normal breath sounds. Neurological:      Mental Status: She is alert. Psychiatric:         Mood and Affect: Mood normal.         Behavior: Behavior normal.       ASSESSMENT and PLAN    ICD-10-CM ICD-9-CM    1. Pregnancy, unspecified gestational age  Z27.80 V22.2 AMB POC URINE PREGNANCY TEST, VISUAL COLOR COMPARISON      REFERRAL TO OBSTETRICS AND GYNECOLOGY      2. Missed menses  N92.6 626.4 REFERRAL TO OBSTETRICS AND GYNECOLOGY      Educated about calling OBGYN for an appointment today  Should start prenatal vitamin at this time    Pt informed to return to office with worsening of symptoms, or PRN with any questions or concerns. Pt verbalizes understanding of plan of care and denies further questions or concerns at this time.

## 2022-12-15 NOTE — PROGRESS NOTES
Identified pt with two pt identifiers(name and ). Chief Complaint   Patient presents with    Pregnancy Test        Health Maintenance Due   Topic    Hepatitis C Screening     COVID-19 Vaccine (1)    DTaP/Tdap/Td series (2 - Td or Tdap)    Depression Screen     Flu Vaccine (1)       Wt Readings from Last 3 Encounters:   12/15/22 126 lb (57.2 kg)   21 123 lb (55.8 kg)   21 123 lb (55.8 kg)     Temp Readings from Last 3 Encounters:   12/15/22 97.8 °F (36.6 °C) (Temporal)   21 98.6 °F (37 °C) (Oral)   20 98.7 °F (37.1 °C)     BP Readings from Last 3 Encounters:   12/15/22 126/80   21 114/60   21 104/62     Pulse Readings from Last 3 Encounters:   12/15/22 (!) 102   21 94   20 (!) 109         Learning Assessment:  :     Learning Assessment 2016   PRIMARY LEARNER Patient Patient   HIGHEST LEVEL OF EDUCATION - PRIMARY LEARNER  SOME COLLEGE -   BARRIERS PRIMARY LEARNER NONE -   CO-LEARNER CAREGIVER No -   PRIMARY LANGUAGE ENGLISH ENGLISH   LEARNER PREFERENCE PRIMARY DEMONSTRATION DEMONSTRATION     LISTENING -     READING -   ANSWERED BY patient pt   RELATIONSHIP SELF SELF       Depression Screening:  :     3 most recent PHQ Screens 12/15/2022   Little interest or pleasure in doing things Not at all   Feeling down, depressed, irritable, or hopeless Not at all   Total Score PHQ 2 0       Fall Risk Assessment:  :     No flowsheet data found. Abuse Screening:  :     Abuse Screening Questionnaire 12/15/2022 2021 2020 2019   Do you ever feel afraid of your partner? N N N N   Are you in a relationship with someone who physically or mentally threatens you? N N N N   Is it safe for you to go home?  Y Y Y Y       Coordination of Care Questionnaire:  :     1) Have you been to an emergency room, urgent care clinic since your last visit? no   Hospitalized since your last visit? no             2) Have you seen or consulted any other health care providers outside of [address] [place] [address] since your last visit? no  (Include any pap smears or colon screenings in this section.)    3) Do you have an Advance Directive on file? no  Are you interested in receiving information about Advance Directives? no    Patient is accompanied by N/A I have received verbal consent from Odin Claros to discuss any/all medical information while they are present in the room. 4.  For patients aged 39-70: Has the patient had a colonoscopy / FIT/ Cologuard? NA - based on age      If the patient is female:    11. For patients aged 41-77: Has the patient had a mammogram within the past 2 years? NA - based on age or sex      10. For patients aged 21-65: Has the patient had a pap smear?  No

## 2023-01-10 ENCOUNTER — ROUTINE PRENATAL (OUTPATIENT)
Dept: OBGYN CLINIC | Age: 28
End: 2023-01-10

## 2023-01-10 VITALS
WEIGHT: 130.6 LBS | SYSTOLIC BLOOD PRESSURE: 98 MMHG | BODY MASS INDEX: 23.14 KG/M2 | HEIGHT: 63 IN | DIASTOLIC BLOOD PRESSURE: 61 MMHG

## 2023-01-10 DIAGNOSIS — Z01.419 ROUTINE GYNECOLOGICAL EXAMINATION: Primary | ICD-10-CM

## 2023-01-10 LAB
HEP B, EXTERNAL RESULT: NEGATIVE
HEPATITIS C ANTIBODY, EXTERNAL RESULT: NEGATIVE
HIV, EXTERNAL RESULT: NORMAL
RPR, EXTERNAL RESULT: NORMAL
RUBELLA TITER, EXTERNAL RESULT: NORMAL

## 2023-01-10 PROCEDURE — 0501F PRENATAL FLOW SHEET: CPT | Performed by: OBSTETRICS & GYNECOLOGY

## 2023-01-10 NOTE — PROGRESS NOTES
Current pregnancy history:    Carolee Arthur is a ,  28 y.o. female [address] [facility] Patient's last menstrual period was 2022. .  She presents for the evaluation of amenorrhea and a positive pregnancy test.    LMP history:  The date of her LMP is  certain. Her last menstrual period was normal and lasted for 4 to 5 days. A urine pregnancy test was positive 4 weeks ago. She was not on the pill at conception. Based on her LMP, her EDC is 23 and her EGA is 8 weeks,6 days. Her menstrual cycles are regular and occur approximately every 28 days  and range from 3 to 5 days. The last menses lasted  the usual number of days. Ultrasound data:  She had an  ultrasound done by the ultrasound tech today which revealed a viable anderson pregnancy with a gestational age of  10 weeks and 0 days giving an EDC of 8/15/23. Pregnancy symptoms:    Since her LMP she has experienced  urinary frequency, breast tenderness, and a lot of nausea. She has not been vomiting over the last few weeks. Associated signs and symptoms which she denies: dysuria, discharge, vaginal bleeding. She states she has had no weight change    Relevant past pregnancy history:   She has the following pregnancy history: PTB 34 weeks    Relevant past medical history:(relevant to this pregnancy): noncontributory. Pap/Occupational history:  Last pap smear: several years ago Results: HPV no fu      Her occupation is: Behavioral counsellor. Substance history: negative for alcohol, tobacco and street drugs. Positive for nothing. Does vape but quit upon finding out pregnant  Exposure history: There is/are no indoor cat/s in the home. The patient was instructed to not change the cat litter. She admits close contact with children on a regular basis. She has had chicken pox or the vaccine in the past.   Patient denies issues with domestic violence.      Genetic Screening/Teratology Counseling: (Includes patient, baby's father, or anyone in either family with:)  1.  Patient's age >/= 28 [facility]?-- no  .   2. [facility]ia ([facility] [place], [address], or  background): MCV<80?--no.     3.  Neural tube defect (meningomyelocele, spina bifida, anencephaly)?--no.   4.  Congenital heart defect?--no.  5.  Down syndrome?--no.   6.  Maikol-Sachs (Rastafarian, [place] citizen)?--no.   7.  Canavan's Disease?--no.   8.  Familial Dysautonomia?--no.   9.  Sickle cell disease or trait ()? --no   The patient has not been tested for sickle trait  10. Hemophilia or other blood disorders?--no. 11.  Muscular dystrophy?--no. 12.  Cystic fibrosis?--no. 13.  [place]'s Chorea?--no. 14.  Mental retardation/autism (if yes was person tested for Fragile X)?--no. 15.  Other inherited genetic or chromosomal disorder?--no. 12.  Maternal metabolic disorder (DM, PKU, etc)?--no. 17.  Patient or FOB with a child with a birth defect not listed above?--no.  17a. Patient or FOB with a birth defect themselves?--no. 18.  Recurrent pregnancy loss, or stillbirth?--no. 19.  Any medications since LMP other than prenatal vitamins (include vitamins, supplements, OTC meds, drugs, alcohol)?--no. 20.  Any other genetic/environmental exposure to discuss?--no. Infection History:  1. Lives with someone with TB or TB exposed?--no.   2.  Patient or partner has history of genital herpes?--no.  3.  Rash or viral illness since LMP?--no.    4.  History of STD (GC, CT, HPV, syphilis, HIV)? --no   5. Other: OTHER? Past Medical History:   Diagnosis Date    Cervical high risk HPV (human papillomavirus) test positive 2016    Nicotine vapor product user      labor     34 weeks.   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   Tobacco Use    Smoking status: Former     Types: Cigarettes     Quit date: 2019     Years since quitting: 3.1    Smokeless tobacco: Former    Tobacco comments:     Vapes  quit when found out pregnant   Vaping Use    Vaping Use: Every day    Substances: Nicotine, Flavoring    Devices: Refillable tank   Substance and Sexual Activity    Alcohol use: Never     Comment: 7 months sober    Drug use: No    Sexual activity: Yes     Partners: Male     Birth control/protection: None     Family History   Problem Relation Age of Onset    No Known Problems Mother     No Known Problems Father     Diabetes Maternal Grandmother      OB History    Para Term  AB Living   3 1 0 1 1 1   SAB IAB Ectopic Molar Multiple Live Births   0 0 0 0 0 1      # Outcome Date GA Lbr Andrea/2nd Weight Sex Delivery Anes PTL Lv   3 Current            2  16 34w0d  3 lb 15 oz (1.786 kg) F Vag-Spont EPI Y AMAURY      Birth Comments: hydrocephalus   shunt   1 AB 2010 20w0d       FD      Birth Comments: delivered at home  (was 11)     No Known Allergies  Prior to Admission medications    Medication Sig Start Date End Date Taking? Authorizing Provider   prenatal no122/iron/folic acid (PRENATAL MULTI PO) Take  by mouth. Yes Provider, Historical   DULoxetine (CYMBALTA) 30 mg capsule Take 30 mg by mouth once over twenty-four (24) hours. Patient not taking: No sig reported 21   Provider, Historical   hydrOXYzine HCL (ATARAX) 10 mg tablet Take 1 Tab by mouth.  1-2 daily po prn  Patient not taking: No sig reported 21   Provider, Historical        Review of Systems: History obtained from the patient  Constitutional: negative for weight loss, fever, night sweats  HEENT: negative for hearing loss, earache, congestion, snoring, sore throat  CV: negative for chest pain, palpitations, edema  Resp: negative for cough, shortness of breath, wheezing  Breast: negative for breast lumps, nipple discharge, galactorrhea  GI: negative for change in bowel habits, abdominal pain, black or bloody stools  : negative for frequency, dysuria, hematuria, vaginal discharge  MSK: negative for back pain, joint pain, muscle pain  Skin: negative for itching, rash, hives  Neuro: negative for dizziness, headache, confusion, weakness  Psych: negative for anxiety, depression, change in mood  Heme/lymph: negative for bleeding, bruising, pallor    Objective:  Visit Vitals  BP 98/61   Ht 5' 3\" (1.6 m)   Wt 130 lb 9.6 oz (59.2 kg)   LMP 11/09/2022   BMI 23.13 kg/m²       Physical Exam:     Constitutional  Appearance: well-nourished, well developed, alert, in no acute distress    HENT  Head  Face: appears normal  Eyes: appear normal  Ears: normal  Mouth: normal  Lips: no lesions    Gastrointestinal  Abdominal Examination: abdomen non-tender to palpation, normal bowel sounds, no masses present  Liver and spleen: no hepatomegaly present, spleen not palpable  Hernias: no hernias identified    Genitourinary  External Genitalia: normal appearance for age, no discharge present, no tenderness present, no inflammatory lesions present, no masses present, no atrophy present  Vagina: normal vaginal vault without central or paravaginal defects, no discharge present, no inflammatory lesions present, no masses present  Bladder: non-tender to palpation  Urethra: appears normal  Cervix: normal   Uterus: enlarged 8-10 weeks, normal shape, soft  Adnexa: no adnexal tenderness present, no adnexal masses present  Perineum: perineum within normal limits, no evidence of trauma, no rashes or skin lesions present  Anus: anus within normal limits, no hemorrhoids present  Inguinal Lymph Nodes: no lymphadenopathy present    Skin  General Inspection: no rash, no lesions identified    Neurologic/Psychiatric  Mental Status:  Orientation: grossly oriented to person, place and time  Mood and Affect: mood normal, affect appropriate    Assessment:     ICD-10-CM ICD-9-CM    1.  Routine gynecological examination  Z01.419 V72.31 PAP IG, APTIMA HPV AND RFX 16/18,45 ([id number])      TYPE & SCREEN      HEP B SURFACE AG      HIV 1/2 AG/AB, 4TH GENERATION,W RFLX CONFIRM      CBC W/O DIFF      RUBELLA AB, IGG      RPR      VITAMIN D, 25 HYDROXY      HEMOGLOBIN A1C WITH EAG      VITAMIN D, 25 HYDROXY      HEMOGLOBIN A1C WITH EAG      HEPATITIS C AB      HEPATITIS C AB        Intrauterine pregnancy with the following problems identified:   Dating LMP = 8 week US  H/o PTL/PTB 34 weeks -- [place]  . Plan:   Discussed NIPT  Course of pregnancy discussed including visit schedule, routine U/S, glucola testing, etc.  Avoid alcoholic beverages and illicit/recreational drugs use  Take prenatal vitamins or folic acid daily. Hospital and practice style discussed with coverage system. Discussed nutrition, toxoplasmosis precautions, sexual activity, exercise, need for influenza vaccine, environmental and work hazards, travel advice, screen for domestic violence, need for seat belts. Discussed seafood, unpasteurized dairy products, deli meat, artificial sweeteners, and caffeine. Information on prenatal classes/breastfeeding given. Information on circumcision given  Patient encouraged not to smoke. Discussed current prescription drug use. Given medication list.  Discussed the use of over the counter medications and chemicals. Route of delivery discussed, including risks, benefits  Pt understands risk of hemorrhage during pregnancy and post delivery and would accept blood products if necessary in life-threatening emergencies    Handouts given to pt. Return for Routine OB Visit.

## 2023-01-10 NOTE — PROGRESS NOTES
Chantel Alejandre is a 28 y.o. female presents for a new pregnancy visit. Chief Complaint   Patient presents with    Initial Prenatal Visit           Patient's last menstrual period was 2022. Last Pap: see report obtained 2 year(s) ago. LMP history:  The date of her LMP is  certain. Her last menstrual period was normal and lasted for 4 to 5 days. A urine pregnancy test was positive 3 weeks ago. She was not on the pill at conception. Based on her LMP, her EDC is 2023 and her EGA is 8 weeks,6 days. Her menstrual cycles are regular and occur approximately every 28 days  and range from 3 to 5 days. The last menses lasted  the usual number of days. Ultrasound data:  She had an  ultrasound done by the ultrasound tech today which revealed a viable anderson pregnancy with a gestational age of 10 weeks and 0 days giving an [address] of 8/15/2023. TA ULTRASOUND PERFORMED  A SINGLE VIABLE 9W0D IUP IS SEEN WITH NORMAL CARDIAC RHYTHM. GESTATIONAL AGE BASED ON TODAY'S ULTRASOUND. A NORMAL YOLK SAC IS SEEN. RIGHT OVARY APPEARS WITHIN NORMAL LIMITS. LEFT OVARY APPEARS WITHIN NORMAL LIMITS. NO FREE FLUID SEEN IN THE CDS. Pregnancy symptoms:    Since her LMP she has experienced  urinary frequency, breast tenderness, and nausea. She has not been vomiting over the last few weeks. Associated signs and symptoms which she denies: dysuria, discharge, vaginal bleeding. She states she has gained weight:  Approximately 5 pounds over the last few weeks. Relevant past pregnancy history:   She has the following pregnancy history:     She has  history of  delivery. Relevant past medical history:(relevant to this pregnancy): noncontributory. Her occupation is: Behavorial tech. 1. Have you been to the ER, urgent care clinic, or hospitalized since your last visit? No    2.  Have you seen or consulted any other health care providers outside of the [address] [place] [address] since your last visit?  No    Examination chaperoned by Ochoa Carey MA.

## 2023-01-11 LAB
25(OH)D3+25(OH)D2 SERPL-MCNC: 30.9 NG/ML (ref 30–100)
ABO GROUP BLD: NORMAL
BLD GP AB SCN SERPL QL: NEGATIVE
ERYTHROCYTE [DISTWIDTH] IN BLOOD BY AUTOMATED COUNT: 12.6 % (ref 11.7–15.4)
EST. AVERAGE GLUCOSE BLD GHB EST-MCNC: 91 MG/DL
HBA1C MFR BLD: 4.8 % (ref 4.8–5.6)
HBV SURFACE AG SERPL QL IA: NEGATIVE
HCT VFR BLD AUTO: 37.3 % (ref 34–46.6)
HCV AB S/CO SERPL IA: <0.1 S/CO RATIO (ref 0–0.9)
HGB BLD-MCNC: 12.4 G/DL (ref 11.1–15.9)
HIV 1+2 AB+HIV1 P24 AG SERPL QL IA: NON REACTIVE
MCH RBC QN AUTO: 29.7 PG (ref 26.6–33)
MCHC RBC AUTO-ENTMCNC: 33.2 G/DL (ref 31.5–35.7)
MCV RBC AUTO: 89 FL (ref 79–97)
PLATELET # BLD AUTO: 352 X10E3/UL (ref 150–450)
RBC # BLD AUTO: 4.18 X10E6/UL (ref 3.77–5.28)
RH BLD: POSITIVE
RPR SER QL: NON REACTIVE
RUBV IGG SERPL IA-ACNC: 1.38 INDEX
WBC # BLD AUTO: 12.1 X10E3/UL (ref 3.4–10.8)

## 2023-02-07 ENCOUNTER — ROUTINE PRENATAL (OUTPATIENT)
Dept: OBGYN CLINIC | Age: 28
End: 2023-02-07
Payer: MEDICAID

## 2023-02-07 VITALS — DIASTOLIC BLOOD PRESSURE: 72 MMHG | BODY MASS INDEX: 23.24 KG/M2 | WEIGHT: 131.2 LBS | SYSTOLIC BLOOD PRESSURE: 114 MMHG

## 2023-02-07 DIAGNOSIS — Z3A.12 12 WEEKS GESTATION OF PREGNANCY: Primary | ICD-10-CM

## 2023-02-07 PROCEDURE — 0502F SUBSEQUENT PRENATAL CARE: CPT | Performed by: OBSTETRICS & GYNECOLOGY

## 2023-02-07 NOTE — PROGRESS NOTES
OB VISIT      Current EGA:   12w6d    Visit Notes:  Doing Well. For NIPT today  Discussed majo use. Pt has read articles saying it didn't work. Total weight gain is 1 lb 3.2 oz (0.544 kg). Problem List:  Dating LMP = 8 week US  H/o PTL/PTB 34 weeks -- Majo (not sure she wants)    Visit Diagnoses:    ICD-10-CM ICD-9-CM    1. 12 weeks gestation of pregnancy  Z3A.12 V22.2 MISC. LAB TEST        Follow Up:  Return in about 4 weeks (around 3/7/2023) for Routine OB Visit.     Brady Hughes MD  02/07/23  1:10 PM none

## 2023-03-13 ENCOUNTER — ROUTINE PRENATAL (OUTPATIENT)
Dept: OBGYN CLINIC | Age: 28
End: 2023-03-13
Payer: MEDICAID

## 2023-03-13 VITALS — DIASTOLIC BLOOD PRESSURE: 79 MMHG | BODY MASS INDEX: 24.73 KG/M2 | SYSTOLIC BLOOD PRESSURE: 128 MMHG | WEIGHT: 139.6 LBS

## 2023-03-13 DIAGNOSIS — Z3A.17 17 WEEKS GESTATION OF PREGNANCY: Primary | ICD-10-CM

## 2023-03-13 PROCEDURE — 0502F SUBSEQUENT PRENATAL CARE: CPT | Performed by: OBSTETRICS & GYNECOLOGY

## 2023-03-13 NOTE — PROGRESS NOTES
OB VISIT      Current EGA:   17w5d    Visit Notes:  Doing Well. +Fetal Movement. No LOF or VB. For AFP today  Sciatica  On left -- stretches advised  Total weight gain is 9 lb 9.6 oz (4.355 kg). Problem List:   Dating LMP = 8 week US   H/o PTL/PTB 34 weeks -- Declines Radha    Boy - Avinash   History of Muscular dystrophy carrier -- advised to get records bc FOB will need testing    Visit Diagnoses:    ICD-10-CM ICD-9-CM    1. 17 weeks gestation of pregnancy  Z3A.17 V22.2 AFP, MATERNAL SCREEN        Follow Up:  Return in about 3 weeks (around 4/3/2023) for Routine OB Visit, OB Ultrasound.     Yanick Chilel MD  03/13/23  2:05 PM

## 2023-03-15 LAB
AFP INTERP SERPL-IMP: NORMAL
AFP INTERP SERPL-IMP: NORMAL
AFP MOM SERPL: 1.14
AFP SERPL-MCNC: 47.2 NG/ML
AGE AT DELIVERY: 28.9 YR
COMMENT, 018013: NORMAL
GA METHOD: NORMAL
GA: 17 WEEKS
IDDM PATIENT QL: NO
MULTIPLE PREGNANCY: NO
NEURAL TUBE DEFECT RISK FETUS: 7850 %
RESULTS, 017004: NORMAL

## 2023-03-28 ENCOUNTER — ROUTINE PRENATAL (OUTPATIENT)
Dept: OBGYN CLINIC | Age: 28
End: 2023-03-28

## 2023-03-28 VITALS — DIASTOLIC BLOOD PRESSURE: 78 MMHG | BODY MASS INDEX: 25.37 KG/M2 | SYSTOLIC BLOOD PRESSURE: 118 MMHG | WEIGHT: 143.2 LBS

## 2023-03-28 DIAGNOSIS — O35.EXX0 PYELECTASIS OF FETUS ON PRENATAL ULTRASOUND: ICD-10-CM

## 2023-03-28 DIAGNOSIS — Z3A.19 19 WEEKS GESTATION OF PREGNANCY: Primary | ICD-10-CM

## 2023-03-28 PROCEDURE — 0502F SUBSEQUENT PRENATAL CARE: CPT | Performed by: OBSTETRICS & GYNECOLOGY

## 2023-03-28 NOTE — PROGRESS NOTES
STANDARD FETAL ANATOMIC SURVEY  A SINGLE VIABLE IUP AT 19W6D GA BY LMP IS SEEN. FETAL CARDIAC MOTION OBSERVED. FETAL ANATOMY WELL VISUALIZED. THERE APPEARS TO BE BILATERAL 4MM PYELECTASIS. ANATOMY NOT SEEN: PROFILE. APPROPRIATE FETAL GROWTH IS SEEN. SIZE=DATES. JAMILA, CERVIX AND PLACENTA APPEAR WITHIN NORMAL LIMITS.   GENDER: XY

## 2023-03-28 NOTE — PROGRESS NOTES
OB VISIT      Current EGA:   19w6d    Visit Notes:  Doing Well. +Fetal Movement. No Ucs. No LOF or VB. Had fetal scan today. Appears normal except bilateral mild pylectasis & poor views of face. NIPT was normal  Total weight gain is 13 lb 3.2 oz (5.987 kg). Problem List:  Dating LMP = 8 week [address]  H/O PTB 34w PTL (was working 60-70hr weeks). Cx @20wk 3.8c  History of Muscular dystrophy carrier -- advised to get records bc FOB will need testing  Fetal pylectasis -- Bilateral 4mm rescan  US 19w6d = 19w5d 59% hadlock  Poor views of face -- repeat scan 28w    Visit Diagnoses:    ICD-10-CM ICD-9-CM    1. 19 weeks gestation of pregnancy  Z3A.19 V22.2       2. Pyelectasis of fetus on prenatal ultrasound  O35. EXX0 796.5         Follow Up:  Return in about 4 weeks (around 4/25/2023) for Routine OB Visit.     Bernard Romo II, MD  03/28/23  1:15 PM

## 2023-04-24 ENCOUNTER — ROUTINE PRENATAL (OUTPATIENT)
Dept: OBGYN CLINIC | Age: 28
End: 2023-04-24
Payer: MEDICAID

## 2023-04-24 VITALS — SYSTOLIC BLOOD PRESSURE: 116 MMHG | DIASTOLIC BLOOD PRESSURE: 75 MMHG | WEIGHT: 149.4 LBS | BODY MASS INDEX: 26.47 KG/M2

## 2023-04-24 DIAGNOSIS — Z3A.23 23 WEEKS GESTATION OF PREGNANCY: Primary | ICD-10-CM

## 2023-04-24 PROCEDURE — 0502F SUBSEQUENT PRENATAL CARE: CPT | Performed by: OBSTETRICS & GYNECOLOGY

## 2023-04-24 NOTE — PROGRESS NOTES
OB VISIT      Current EGA:   23w5d    Visit Notes:  Doing Well. +Fetal Movement. No Ucs. No LOF or VB. No complaints    Visit Vitals  /75   Wt 149 lb 6.4 oz (67.8 kg)   BMI 26.47 kg/m²     Total weight gain is 19 lb 6.4 oz (8.8 kg). Problem List:   Dating LMP = 8 week US   Boy - Avinash   H/O PTB 34w PTL (was working 60-70hr weeks). Cx @20wk 3.8c   History of Muscular dystrophy carrier -- advised to get records bc FOB will need testing   Fetal pylectasis -- Bilateral 4mm rescan   US 19w6d = 19w5d 59% hadlock   Poor views of face -- repeat scan 28w    Visit Diagnoses:    ICD-10-CM ICD-9-CM    1. 23 weeks gestation of pregnancy  Z3A.23 V22.2         Follow Up:  Return in about 4 weeks (around 5/22/2023) for Routine OB Visit, Estrellita.     Tucker Russo MD  04/24/23  12:03 PM

## 2023-05-23 ENCOUNTER — ROUTINE PRENATAL (OUTPATIENT)
Age: 28
End: 2023-05-23
Payer: MEDICAID

## 2023-05-23 VITALS — WEIGHT: 159.4 LBS | SYSTOLIC BLOOD PRESSURE: 109 MMHG | BODY MASS INDEX: 28.24 KG/M2 | DIASTOLIC BLOOD PRESSURE: 70 MMHG

## 2023-05-23 DIAGNOSIS — Z3A.27 27 WEEKS GESTATION OF PREGNANCY: Primary | ICD-10-CM

## 2023-05-23 DIAGNOSIS — Z23 NEED FOR DIPHTHERIA-TETANUS-PERTUSSIS (TDAP) VACCINE: ICD-10-CM

## 2023-05-23 DIAGNOSIS — O35.EXX0 PYELECTASIS OF FETUS ON PRENATAL ULTRASOUND: ICD-10-CM

## 2023-05-23 PROCEDURE — 90471 IMMUNIZATION ADMIN: CPT | Performed by: OBSTETRICS & GYNECOLOGY

## 2023-05-23 PROCEDURE — 0502F SUBSEQUENT PRENATAL CARE: CPT | Performed by: OBSTETRICS & GYNECOLOGY

## 2023-05-23 PROCEDURE — 90715 TDAP VACCINE 7 YRS/> IM: CPT | Performed by: OBSTETRICS & GYNECOLOGY

## 2023-05-23 RX ORDER — VITAMIN A ACETATE, BETA CAROTENE, ASCORBIC ACID, CHOLECALCIFEROL, .ALPHA.-TOCOPHEROL ACETATE, DL-, THIAMINE MONONITRATE, RIBOFLAVIN, NIACINAMIDE, PYRIDOXINE HYDROCHLORIDE, FOLIC ACID, CYANOCOBALAMIN, CALCIUM CARBONATE, FERROUS FUMARATE, ZINC OXIDE, CUPRIC OXIDE 3080; 12; 120; 400; 1; 1.84; 3; 20; 22; 920; 25; 200; 27; 10; 2 [IU]/1; UG/1; MG/1; [IU]/1; MG/1; MG/1; MG/1; MG/1; MG/1; [IU]/1; MG/1; MG/1; MG/1; MG/1; MG/1
TABLET, FILM COATED ORAL
COMMUNITY

## 2023-05-23 NOTE — PROGRESS NOTES
LIMITED OB SCAN  A SINGLE 27W6D IUP IS SEEN. FETAL CARDIAC MOTION OBSERVED. LIMITED ANATOMY WAS VISUALIZED AND APPEARS WNL. APPROPRIATE FETAL GROWTH IS SEEN. SIZE=DATES. DEWAYNE AND PLACENTA APPEAR WITHIN NORMAL LIMITS. After obtaining consent, and per orders of Dr. Jas Castorena, injection of Tdap given in right deltoid by ANY JOSEPH CMA. Patient instructed to remain in clinic for 20 minutes afterwards, and to report any adverse reaction to me immediately.

## 2023-05-23 NOTE — PROGRESS NOTES
OB VISIT      Current EGA:   27w6d    Visit Notes:  Doing Well. +Fetal Movement. No Ucs. No LOF or VB  Back & hips sore. For glucola/cbc today  TDAP today    Had US today shows:   LIMITED OB SCAN  A SINGLE 27W6D IUP IS SEEN. FETAL CARDIAC MOTION OBSERVED. LIMITED ANATOMY WAS VISUALIZED AND APPEARS WNL. APPROPRIATE FETAL GROWTH IS SEEN. SIZE=DATES. DEWAYNE AND PLACENTA APPEAR WITHIN NORMAL LIMITS. Total weight gain is 28 lb 6.4 oz (12.9 kg). (Total expected for pregnancy is 25 lb (11.5 kg)-35 lb (16 kg))    Problem List:  Dating LMP = 8 week US  Boy - Satish  H/O PTB 34w PTL (was working 60-70hr weeks). Cx @20wk 3.8c  History of Muscular dystrophy carrier -- advised to get records bc FOB will need testing 28w still waiting  Fetal pylectasis -- Bilateral 4mm rescan appears to have resolved 28w  US 19w6d = 19w5d 59% hadlock  Poor views of face -- repeat scan 28w  US 27w6d = 28w2d growth 51%    Visit Diagnoses:   Diagnosis Orders   1. 27 weeks gestation of pregnancy  Glucose Tolerance Gestational, 1 Hour    CBC    Tdap, BOOSTRIX, (age 8 yrs+), IM    CBC    Glucose Tolerance Gestational, 1 Hour      2. Need for diphtheria-tetanus-pertussis (Tdap) vaccine  Tdap, BOOSTRIX, (age 8 yrs+), IM      3. Pyelectasis of fetus on prenatal ultrasound          CURRENT MEDS W/ ASSOC DIAG           Start Date End Date     Prenatal Vit-Fe Fumarate-FA (PRENATAL PLUS) 27-1 MG TABS  --  --     Associated Diagnoses:  --             Follow Up:  Return in about 3 weeks (around 6/13/2023) for Routine OB.     Tiffanie Laurent MD  05/23/23  2:45 PM

## 2023-05-24 LAB
ERYTHROCYTE [DISTWIDTH] IN BLOOD BY AUTOMATED COUNT: 12.1 % (ref 11.7–15.4)
GLUCOSE 1H P 50 G GLC PO SERPL-MCNC: 98 MG/DL (ref 70–139)
HCT VFR BLD AUTO: 34.9 % (ref 34–46.6)
HGB BLD-MCNC: 12 G/DL (ref 11.1–15.9)
MCH RBC QN AUTO: 31.9 PG (ref 26.6–33)
MCHC RBC AUTO-ENTMCNC: 34.4 G/DL (ref 31.5–35.7)
MCV RBC AUTO: 93 FL (ref 79–97)
PLATELET # BLD AUTO: 277 X10E3/UL (ref 150–450)
RBC # BLD AUTO: 3.76 X10E6/UL (ref 3.77–5.28)
WBC # BLD AUTO: 12.5 X10E3/UL (ref 3.4–10.8)

## 2023-06-26 SDOH — ECONOMIC STABILITY: TRANSPORTATION INSECURITY
IN THE PAST 12 MONTHS, HAS LACK OF TRANSPORTATION KEPT YOU FROM MEETINGS, WORK, OR FROM GETTING THINGS NEEDED FOR DAILY LIVING?: PATIENT DECLINED

## 2023-06-26 SDOH — ECONOMIC STABILITY: FOOD INSECURITY: WITHIN THE PAST 12 MONTHS, YOU WORRIED THAT YOUR FOOD WOULD RUN OUT BEFORE YOU GOT MONEY TO BUY MORE.: PATIENT DECLINED

## 2023-06-26 SDOH — ECONOMIC STABILITY: INCOME INSECURITY: HOW HARD IS IT FOR YOU TO PAY FOR THE VERY BASICS LIKE FOOD, HOUSING, MEDICAL CARE, AND HEATING?: PATIENT DECLINED

## 2023-06-26 SDOH — ECONOMIC STABILITY: HOUSING INSECURITY
IN THE LAST 12 MONTHS, WAS THERE A TIME WHEN YOU DID NOT HAVE A STEADY PLACE TO SLEEP OR SLEPT IN A SHELTER (INCLUDING NOW)?: PATIENT REFUSED

## 2023-06-26 SDOH — ECONOMIC STABILITY: FOOD INSECURITY: WITHIN THE PAST 12 MONTHS, THE FOOD YOU BOUGHT JUST DIDN'T LAST AND YOU DIDN'T HAVE MONEY TO GET MORE.: PATIENT DECLINED

## 2023-06-27 ENCOUNTER — ROUTINE PRENATAL (OUTPATIENT)
Age: 28
End: 2023-06-27

## 2023-06-27 VITALS — SYSTOLIC BLOOD PRESSURE: 122 MMHG | WEIGHT: 168.2 LBS | BODY MASS INDEX: 29.8 KG/M2 | DIASTOLIC BLOOD PRESSURE: 79 MMHG

## 2023-06-27 DIAGNOSIS — Z3A.32 32 WEEKS GESTATION OF PREGNANCY: Primary | ICD-10-CM

## 2023-06-27 PROCEDURE — 0502F SUBSEQUENT PRENATAL CARE: CPT | Performed by: OBSTETRICS & GYNECOLOGY

## 2023-07-11 ENCOUNTER — ROUTINE PRENATAL (OUTPATIENT)
Age: 28
End: 2023-07-11

## 2023-07-11 VITALS — DIASTOLIC BLOOD PRESSURE: 85 MMHG | WEIGHT: 167.6 LBS | BODY MASS INDEX: 29.69 KG/M2 | SYSTOLIC BLOOD PRESSURE: 121 MMHG

## 2023-07-11 DIAGNOSIS — Z3A.34 34 WEEKS GESTATION OF PREGNANCY: Primary | ICD-10-CM

## 2023-07-11 LAB — GBS, EXTERNAL RESULT: POSITIVE

## 2023-07-11 PROCEDURE — 0502F SUBSEQUENT PRENATAL CARE: CPT | Performed by: OBSTETRICS & GYNECOLOGY

## 2023-07-11 NOTE — PROGRESS NOTES
OB VISIT      Current EGA:   34w6d    Visit Notes:  Doing Well. +Fetal Movement. No Ucs. No LOF or VB. Feels like lost mucous plug 7/4/23. Occ cramps but are mild. Had [address] today showing normal growth @28%  For GBS today    Total weight gain is 36 lb 9.6 oz (16.6 kg). (Total expected for pregnancy is 25 lb (11.5 kg)-35 lb (16 kg))  Last Weight Metrics:  Weight Loss Metrics 7/11/2023 6/27/2023 6/13/2023 5/23/2023 4/24/2023 3/28/2023 3/13/2023   Height - - - - - - -   Weight 167 lbs 10 oz 168 lbs 3 oz 164 lbs 159 lbs 6 oz 149 lbs 6 oz 143 lbs 3 oz 139 lbs 10 oz   BMI (Calculated) 0 kg/m2 0 kg/m2 0 kg/m2 0 kg/m2 0 kg/m2 0 kg/m2 0 kg/m2        Vitals:    07/11/23 1415   BP: 121/85        Problem List:  Dating LMP = 8 week US  Boy - Charles  H/O PTB 34w PTL (was working 60-70hr weeks). Cx @20wk 3.8c  History of Muscular dystrophy carrier -- advised to get records bc FOB will need testing 28w still waiting  Fetal pylectasis -- Bilateral 4mm rescan appears to have resolved 28w  US 19w6d = 19w5d 59% hadlock  Poor views of face -- repeat scan 28w WNL  US 27w6d = 28w2d growth 51% had  US 34w6d = 34w0d growth 28% had       Current Outpatient Medications   Medication Instructions    Prenatal Vit-Fe Fumarate-FA (PRENATAL PLUS) 27-1 MG TABS Oral        Visit Diagnoses:   Diagnosis Orders   1. 34 weeks gestation of pregnancy            Follow Up:  Return in about 1 week (around 7/18/2023) for Routine OB.     Jenny Richard MD, [id number] [facility]  07/11/23  2:24 PM

## 2023-07-11 NOTE — PROGRESS NOTES
LIMITED OB SCAN  A SINGLE VERTEX 34W6D IUP IS SEEN. FETAL CARDIAC MOTION OBSERVED. LIMITED ANATOMY WAS VISUALIZED AND APPEARS WNL. APPROPRIATE FETAL GROWTH IS SEEN. SIZE=DATES. DEWAYNE AND PLACENTA APPEAR WITHIN NORMAL LIMITS.

## 2023-07-13 PROBLEM — O99.820 GBS (GROUP B STREPTOCOCCUS CARRIER), +RV CULTURE, CURRENTLY PREGNANT: Status: ACTIVE | Noted: 2023-07-13

## 2023-07-13 LAB — GP B STREP DNA SPEC QL NAA+PROBE: POSITIVE

## 2023-07-18 ENCOUNTER — ROUTINE PRENATAL (OUTPATIENT)
Age: 28
End: 2023-07-18

## 2023-07-18 VITALS — BODY MASS INDEX: 30.47 KG/M2 | WEIGHT: 172 LBS | SYSTOLIC BLOOD PRESSURE: 122 MMHG | DIASTOLIC BLOOD PRESSURE: 79 MMHG

## 2023-07-18 DIAGNOSIS — Z3A.35 PREGNANCY WITH 35 COMPLETED WEEKS GESTATION: Primary | ICD-10-CM

## 2023-07-18 PROCEDURE — 0502F SUBSEQUENT PRENATAL CARE: CPT | Performed by: OBSTETRICS & GYNECOLOGY

## 2023-07-18 NOTE — PROGRESS NOTES
OB VISIT      Current EGA:   35w6d    Visit Notes:  Doing Well. +Fetal Movement. Some runs of Ucs longest was 1 hr. No LOF or VB. Some pelvic pressure    Total weight gain is 41 lb (18.6 kg). (Total expected for pregnancy is 25 lb (11.5 kg)-35 lb (16 kg))  Last Weight Metrics:  Weight Loss Metrics 7/18/2023 7/11/2023 6/27/2023 6/13/2023 5/23/2023 4/24/2023 3/28/2023   Height - - - - - - -   Weight 172 lbs 167 lbs 10 oz 168 lbs 3 oz 164 lbs 159 lbs 6 oz 149 lbs 6 oz 143 lbs 3 oz   BMI (Calculated) 0 kg/m2 0 kg/m2 0 kg/m2 0 kg/m2 0 kg/m2 0 kg/m2 0 kg/m2        Vitals:    07/18/23 1352   BP: 122/79        Problem List:  Dating LMP = 8 week US  Boy - Charles  H/O PTB 34w PTL (was working 60-70hr weeks). Cx @20wk 3.8c  History of Muscular dystrophy carrier -- advised to get records bc FOB will need testing 28w still waiting  Fetal pylectasis -- Bilateral 4mm rescan appears to have resolved 28w  US 19w6d = 19w5d 59% hadlock  Poor views of face -- repeat scan 28w WNL  US 27w6d = 28w2d growth 51% had  US 34w6d = 34w0d growth 28% had  GBS Positive     Current Outpatient Medications   Medication Instructions    Prenatal Vit-Fe Fumarate-FA (PRENATAL PLUS) 27-1 MG TABS Oral        Visit Diagnoses:   Diagnosis Orders   1. Pregnancy with 35 completed weeks gestation            Follow Up:  Return in about 1 week (around 7/25/2023) for Routine OB.     Jenny Richard MD, [id number] [facility]  07/18/23  2:27 PM

## 2023-07-21 ENCOUNTER — HOSPITAL ENCOUNTER (OUTPATIENT)
Facility: HOSPITAL | Age: 28
Discharge: HOME | DRG: 560 | End: 2023-07-22
Attending: OBSTETRICS & GYNECOLOGY | Admitting: OBSTETRICS & GYNECOLOGY
Payer: MEDICAID

## 2023-07-21 LAB
ABO, EXTERNAL RESULT: NORMAL
ALBUMIN SERPL-MCNC: 2.6 G/DL (ref 3.5–5)
ALBUMIN/GLOB SERPL: 0.8 (ref 1.1–2.2)
ALP SERPL-CCNC: 150 U/L (ref 45–117)
ALT SERPL-CCNC: 22 U/L (ref 12–78)
ANION GAP SERPL CALC-SCNC: 4 MMOL/L (ref 5–15)
AST SERPL-CCNC: 17 U/L (ref 15–37)
BILIRUB SERPL-MCNC: 0.3 MG/DL (ref 0.2–1)
BUN SERPL-MCNC: 14 MG/DL (ref 6–20)
BUN/CREAT SERPL: 25 (ref 12–20)
CALCIUM SERPL-MCNC: 8.9 MG/DL (ref 8.5–10.1)
CHLORIDE SERPL-SCNC: 110 MMOL/L (ref 97–108)
CO2 SERPL-SCNC: 24 MMOL/L (ref 21–32)
COMMENT:: NORMAL
CREAT SERPL-MCNC: 0.56 MG/DL (ref 0.55–1.02)
ERYTHROCYTE [DISTWIDTH] IN BLOOD BY AUTOMATED COUNT: 12.5 % (ref 11.5–14.5)
GLOBULIN SER CALC-MCNC: 3.3 G/DL (ref 2–4)
GLUCOSE SERPL-MCNC: 99 MG/DL (ref 65–100)
HCT VFR BLD AUTO: 36.2 % (ref 35–47)
HGB BLD-MCNC: 12.2 G/DL (ref 11.5–16)
MCH RBC QN AUTO: 31.3 PG (ref 26–34)
MCHC RBC AUTO-ENTMCNC: 33.7 G/DL (ref 30–36.5)
MCV RBC AUTO: 92.8 FL (ref 80–99)
NRBC # BLD: 0 K/UL (ref 0–0.01)
NRBC BLD-RTO: 0 PER 100 WBC
PLATELET # BLD AUTO: 218 K/UL (ref 150–400)
PMV BLD AUTO: 9.2 FL (ref 8.9–12.9)
POTASSIUM SERPL-SCNC: 4.1 MMOL/L (ref 3.5–5.1)
PROT SERPL-MCNC: 5.9 G/DL (ref 6.4–8.2)
RBC # BLD AUTO: 3.9 M/UL (ref 3.8–5.2)
RH FACTOR, EXTERNAL RESULT: POSITIVE
SODIUM SERPL-SCNC: 138 MMOL/L (ref 136–145)
SPECIMEN HOLD: NORMAL
WBC # BLD AUTO: 14.6 K/UL (ref 3.6–11)

## 2023-07-21 PROCEDURE — 85027 COMPLETE CBC AUTOMATED: CPT

## 2023-07-21 PROCEDURE — 86901 BLOOD TYPING SEROLOGIC RH(D): CPT

## 2023-07-21 PROCEDURE — 86850 RBC ANTIBODY SCREEN: CPT

## 2023-07-21 PROCEDURE — 80053 COMPREHEN METABOLIC PANEL: CPT

## 2023-07-21 PROCEDURE — 86900 BLOOD TYPING SEROLOGIC ABO: CPT

## 2023-07-21 PROCEDURE — 84156 ASSAY OF PROTEIN URINE: CPT

## 2023-07-21 PROCEDURE — 82570 ASSAY OF URINE CREATININE: CPT

## 2023-07-21 PROCEDURE — 36415 COLL VENOUS BLD VENIPUNCTURE: CPT

## 2023-07-22 ENCOUNTER — HOSPITAL ENCOUNTER (INPATIENT)
Facility: HOSPITAL | Age: 28
LOS: 3 days | Discharge: HOME | DRG: 560 | End: 2023-07-25
Attending: OBSTETRICS & GYNECOLOGY | Admitting: OBSTETRICS & GYNECOLOGY
Payer: MEDICAID

## 2023-07-22 ENCOUNTER — ANESTHESIA EVENT (OUTPATIENT)
Facility: HOSPITAL | Age: 28
DRG: 560 | End: 2023-07-22
Payer: MEDICAID

## 2023-07-22 ENCOUNTER — ANESTHESIA (OUTPATIENT)
Facility: HOSPITAL | Age: 28
DRG: 560 | End: 2023-07-22
Payer: MEDICAID

## 2023-07-22 VITALS
TEMPERATURE: 98.5 F | OXYGEN SATURATION: 97 % | HEART RATE: 82 BPM | SYSTOLIC BLOOD PRESSURE: 104 MMHG | DIASTOLIC BLOOD PRESSURE: 64 MMHG | RESPIRATION RATE: 16 BRPM

## 2023-07-22 DIAGNOSIS — R52 POSTPARTUM PAIN: Primary | ICD-10-CM

## 2023-07-22 PROBLEM — O35.EXX0 PYELECTASIS OF FETUS ON PRENATAL ULTRASOUND: Status: RESOLVED | Noted: 2023-03-28 | Resolved: 2023-07-22

## 2023-07-22 PROBLEM — O60.03 PREMATURE LABOR IN THIRD TRIMESTER: Status: ACTIVE | Noted: 2023-07-22

## 2023-07-22 PROBLEM — Z3A.36 36 WEEKS GESTATION OF PREGNANCY: Status: ACTIVE | Noted: 2023-07-22

## 2023-07-22 PROBLEM — O99.820 GBS (GROUP B STREPTOCOCCUS CARRIER), +RV CULTURE, CURRENTLY PREGNANT: Status: RESOLVED | Noted: 2023-07-13 | Resolved: 2023-07-22

## 2023-07-22 LAB
ABO + RH BLD: NORMAL
ABO + RH BLD: NORMAL
BLOOD GROUP ANTIBODIES SERPL: NORMAL
BLOOD GROUP ANTIBODIES SERPL: NORMAL
CREAT UR-MCNC: 67 MG/DL
ERYTHROCYTE [DISTWIDTH] IN BLOOD BY AUTOMATED COUNT: 12.8 % (ref 11.5–14.5)
HCT VFR BLD AUTO: 42.4 % (ref 35–47)
HGB BLD-MCNC: 14.2 G/DL (ref 11.5–16)
MCH RBC QN AUTO: 30.5 PG (ref 26–34)
MCHC RBC AUTO-ENTMCNC: 33.5 G/DL (ref 30–36.5)
MCV RBC AUTO: 91.2 FL (ref 80–99)
NRBC # BLD: 0 K/UL (ref 0–0.01)
NRBC BLD-RTO: 0 PER 100 WBC
PLATELET # BLD AUTO: 263 K/UL (ref 150–400)
PMV BLD AUTO: 9.2 FL (ref 8.9–12.9)
PROT UR-MCNC: 15 MG/DL (ref 0–11.9)
PROT/CREAT UR-RTO: 0.2
RBC # BLD AUTO: 4.65 M/UL (ref 3.8–5.2)
SPECIMEN EXP DATE BLD: NORMAL
SPECIMEN EXP DATE BLD: NORMAL
WBC # BLD AUTO: 17.1 K/UL (ref 3.6–11)

## 2023-07-22 PROCEDURE — 36415 COLL VENOUS BLD VENIPUNCTURE: CPT

## 2023-07-22 PROCEDURE — 7210000100 HC LABOR FEE PER 1 HR: Performed by: OBSTETRICS & GYNECOLOGY

## 2023-07-22 PROCEDURE — 3700000025 EPIDURAL BLOCK: Performed by: ANESTHESIOLOGY

## 2023-07-22 PROCEDURE — 86900 BLOOD TYPING SEROLOGIC ABO: CPT

## 2023-07-22 PROCEDURE — 51702 INSERT TEMP BLADDER CATH: CPT

## 2023-07-22 PROCEDURE — 85027 COMPLETE CBC AUTOMATED: CPT

## 2023-07-22 PROCEDURE — 86850 RBC ANTIBODY SCREEN: CPT

## 2023-07-22 PROCEDURE — 1100000000 HC RM PRIVATE

## 2023-07-22 PROCEDURE — 6370000000 HC RX 637 (ALT 250 FOR IP): Performed by: OBSTETRICS & GYNECOLOGY

## 2023-07-22 PROCEDURE — 6360000002 HC RX W HCPCS: Performed by: OBSTETRICS & GYNECOLOGY

## 2023-07-22 PROCEDURE — 6360000002 HC RX W HCPCS: Performed by: ANESTHESIOLOGY

## 2023-07-22 PROCEDURE — 86901 BLOOD TYPING SEROLOGIC RH(D): CPT

## 2023-07-22 PROCEDURE — 99285 EMERGENCY DEPT VISIT HI MDM: CPT

## 2023-07-22 PROCEDURE — 2500000003 HC RX 250 WO HCPCS: Performed by: ANESTHESIOLOGY

## 2023-07-22 PROCEDURE — 2580000003 HC RX 258: Performed by: OBSTETRICS & GYNECOLOGY

## 2023-07-22 RX ORDER — SODIUM CHLORIDE, SODIUM LACTATE, POTASSIUM CHLORIDE, AND CALCIUM CHLORIDE .6; .31; .03; .02 G/100ML; G/100ML; G/100ML; G/100ML
1000 INJECTION, SOLUTION INTRAVENOUS PRN
Status: DISCONTINUED | OUTPATIENT
Start: 2023-07-22 | End: 2023-07-23

## 2023-07-22 RX ORDER — DOCUSATE SODIUM 100 MG/1
100 CAPSULE, LIQUID FILLED ORAL 2 TIMES DAILY
Status: DISCONTINUED | OUTPATIENT
Start: 2023-07-22 | End: 2023-07-23

## 2023-07-22 RX ORDER — BUPIVACAINE HYDROCHLORIDE 2.5 MG/ML
INJECTION, SOLUTION EPIDURAL; INFILTRATION; INTRACAUDAL PRN
Status: DISCONTINUED | OUTPATIENT
Start: 2023-07-22 | End: 2023-07-23 | Stop reason: SDUPTHER

## 2023-07-22 RX ORDER — SODIUM CHLORIDE 0.9 % (FLUSH) 0.9 %
5-40 SYRINGE (ML) INJECTION EVERY 12 HOURS SCHEDULED
Status: DISCONTINUED | OUTPATIENT
Start: 2023-07-22 | End: 2023-07-23

## 2023-07-22 RX ORDER — SODIUM CHLORIDE 0.9 % (FLUSH) 0.9 %
5-40 SYRINGE (ML) INJECTION PRN
Status: DISCONTINUED | OUTPATIENT
Start: 2023-07-22 | End: 2023-07-23

## 2023-07-22 RX ORDER — SODIUM CHLORIDE, SODIUM LACTATE, POTASSIUM CHLORIDE, AND CALCIUM CHLORIDE .6; .31; .03; .02 G/100ML; G/100ML; G/100ML; G/100ML
500 INJECTION, SOLUTION INTRAVENOUS PRN
Status: DISCONTINUED | OUTPATIENT
Start: 2023-07-22 | End: 2023-07-23

## 2023-07-22 RX ORDER — MISOPROSTOL 200 UG/1
400 TABLET ORAL PRN
Status: DISCONTINUED | OUTPATIENT
Start: 2023-07-22 | End: 2023-07-23

## 2023-07-22 RX ORDER — PENICILLIN G POTASSIUM 5000000 [IU]/1
INJECTION, POWDER, FOR SOLUTION INTRAMUSCULAR; INTRAVENOUS
Status: DISCONTINUED
Start: 2023-07-22 | End: 2023-07-23

## 2023-07-22 RX ORDER — BUPIVACAINE HYDROCHLORIDE 2.5 MG/ML
INJECTION, SOLUTION EPIDURAL; INFILTRATION; INTRACAUDAL PRN
Status: DISCONTINUED | OUTPATIENT
Start: 2023-07-22 | End: 2023-07-22

## 2023-07-22 RX ORDER — TERBUTALINE SULFATE 1 MG/ML
0.25 INJECTION, SOLUTION SUBCUTANEOUS
Status: DISCONTINUED | OUTPATIENT
Start: 2023-07-22 | End: 2023-07-23

## 2023-07-22 RX ORDER — ONDANSETRON 2 MG/ML
4 INJECTION INTRAMUSCULAR; INTRAVENOUS EVERY 6 HOURS PRN
Status: DISCONTINUED | OUTPATIENT
Start: 2023-07-22 | End: 2023-07-23

## 2023-07-22 RX ORDER — TRANEXAMIC ACID 10 MG/ML
1000 INJECTION, SOLUTION INTRAVENOUS
Status: DISCONTINUED | OUTPATIENT
Start: 2023-07-22 | End: 2023-07-23

## 2023-07-22 RX ORDER — CARBOPROST TROMETHAMINE 250 UG/ML
250 INJECTION, SOLUTION INTRAMUSCULAR PRN
Status: DISCONTINUED | OUTPATIENT
Start: 2023-07-22 | End: 2023-07-23

## 2023-07-22 RX ORDER — SODIUM CHLORIDE 9 MG/ML
INJECTION, SOLUTION INTRAVENOUS PRN
Status: DISCONTINUED | OUTPATIENT
Start: 2023-07-22 | End: 2023-07-23

## 2023-07-22 RX ORDER — METHYLERGONOVINE MALEATE 0.2 MG/ML
200 INJECTION INTRAVENOUS PRN
Status: DISCONTINUED | OUTPATIENT
Start: 2023-07-22 | End: 2023-07-23

## 2023-07-22 RX ORDER — NALOXONE HYDROCHLORIDE 0.4 MG/ML
INJECTION, SOLUTION INTRAMUSCULAR; INTRAVENOUS; SUBCUTANEOUS PRN
Status: DISCONTINUED | OUTPATIENT
Start: 2023-07-22 | End: 2023-07-23

## 2023-07-22 RX ORDER — ACETAMINOPHEN 325 MG/1
650 TABLET ORAL EVERY 4 HOURS PRN
Status: DISCONTINUED | OUTPATIENT
Start: 2023-07-22 | End: 2023-07-23

## 2023-07-22 RX ORDER — FENTANYL 0.2 MG/100ML-BUPIV 0.125%-NACL 0.9% EPIDURAL INJ 2/0.125 MCG/ML-%
10 SOLUTION INJECTION CONTINUOUS
Status: DISCONTINUED | OUTPATIENT
Start: 2023-07-22 | End: 2023-07-23

## 2023-07-22 RX ORDER — SODIUM CHLORIDE, SODIUM LACTATE, POTASSIUM CHLORIDE, CALCIUM CHLORIDE 600; 310; 30; 20 MG/100ML; MG/100ML; MG/100ML; MG/100ML
INJECTION, SOLUTION INTRAVENOUS CONTINUOUS
Status: DISCONTINUED | OUTPATIENT
Start: 2023-07-22 | End: 2023-07-23

## 2023-07-22 RX ORDER — LIDOCAINE HYDROCHLORIDE AND EPINEPHRINE 20; 5 MG/ML; UG/ML
INJECTION, SOLUTION EPIDURAL; INFILTRATION; INTRACAUDAL; PERINEURAL PRN
Status: DISCONTINUED | OUTPATIENT
Start: 2023-07-22 | End: 2023-07-23 | Stop reason: SDUPTHER

## 2023-07-22 RX ORDER — CALCIUM CARBONATE 500 MG/1
500 TABLET, CHEWABLE ORAL ONCE
Status: COMPLETED | OUTPATIENT
Start: 2023-07-22 | End: 2023-07-22

## 2023-07-22 RX ADMIN — LIDOCAINE HYDROCHLORIDE,EPINEPHRINE BITARTRATE 3 ML: 20; .005 INJECTION, SOLUTION EPIDURAL; INFILTRATION; INTRACAUDAL; PERINEURAL at 13:20

## 2023-07-22 RX ADMIN — OXYTOCIN 2 MILLI-UNITS/MIN: 10 INJECTION, SOLUTION INTRAMUSCULAR; INTRAVENOUS at 19:40

## 2023-07-22 RX ADMIN — BUPIVACAINE HYDROCHLORIDE 9 MG: 2.5 INJECTION, SOLUTION EPIDURAL; INFILTRATION; INTRACAUDAL; PERINEURAL at 13:24

## 2023-07-22 RX ADMIN — SODIUM CHLORIDE 2.5 MILLION UNITS: 9 INJECTION, SOLUTION INTRAVENOUS at 17:06

## 2023-07-22 RX ADMIN — SODIUM CHLORIDE, POTASSIUM CHLORIDE, SODIUM LACTATE AND CALCIUM CHLORIDE: 600; 310; 30; 20 INJECTION, SOLUTION INTRAVENOUS at 13:18

## 2023-07-22 RX ADMIN — SODIUM CHLORIDE, POTASSIUM CHLORIDE, SODIUM LACTATE AND CALCIUM CHLORIDE 1000 ML: 600; 310; 30; 20 INJECTION, SOLUTION INTRAVENOUS at 12:40

## 2023-07-22 RX ADMIN — SODIUM CHLORIDE 5 MILLION UNITS: 900 INJECTION INTRAVENOUS at 12:45

## 2023-07-22 RX ADMIN — Medication 10 ML/HR: at 20:01

## 2023-07-22 RX ADMIN — Medication 10 ML/HR: at 13:39

## 2023-07-22 RX ADMIN — ANTACID TABLETS 500 MG: 500 TABLET, CHEWABLE ORAL at 16:05

## 2023-07-22 RX ADMIN — SODIUM CHLORIDE 2.5 MILLION UNITS: 9 INJECTION, SOLUTION INTRAVENOUS at 20:48

## 2023-07-22 NOTE — H&P
History and Physical    Patient: Aviva Carvajal MRN: [id number]  SSN: [id number]    YOB: 1995  Age: 28 y.o. Sex: female      Subjective:      Aviva Carvajal is a 28 y.o. female who is a  at 36.3 weeks gestation with ALEX of 2023. Pt cam in with complaints of contractions that were anywhere from 3-6 minutes apart in her house. Pt states since being in the hospital.  Pt has hx of  delivery at 34 weeks gestation in 2016. Pt admits that since being at the hospital her contractions have spaced out significantly. Past Medical History:   Diagnosis Date    Cervical high risk HPV (human papillomavirus) test positive     Nicotine vapor product user      labor     34w     Past Surgical History:   Procedure Laterality Date    APPENDECTOMY        Family History   Problem Relation Age of Onset    No Known Problems Mother     No Known Problems Father     Diabetes Maternal Grandmother      Social History     Tobacco Use    Smoking status: Former     Types: Cigarettes     Quit date: 2019     Years since quitting: 3.6    Smokeless tobacco: Current   Substance Use Topics    Alcohol use: Not Currently      Prior to Admission medications    Medication Sig Start Date End Date Taking? Authorizing Provider   Prenatal Vit-Fe Fumarate-FA (PRENATAL PLUS) 27-1 MG TABS Take by mouth    Historical Provider, MD        No Known Allergies    Review of Systems:  A comprehensive review of systems was negative except for that written in the History of Present Illness. Objective:     Vitals:    23 0021 23 0023 23 0028 23 0207   BP: 125/82   104/64   Pulse: 98   82   Resp:       Temp:       TempSrc:       SpO2:  98% 97%         Cat 1 tracing:  FHR baseline 130's, acels present , no decels, moderate variability. Contractions are irregular ( q 9 -10 minutes apart. ). Abdomen is mild to palpation.      Physical Exam:  Eyes: pupils equal, round, and reactive to light, extraocular

## 2023-07-22 NOTE — H&P
History & Physical    Name: Nasima Tam MRN: [id number]  SSN: [id number]    YOB: 1995  Age: 28 y.o. Sex: female        Subjective:   Chief Complaint: Contractions  Estimated Date of Delivery: 23  OB History    Para Term  AB Living   3 1 0 1 1 1   SAB IAB Ectopic Molar Multiple Live Births   1 0 0 0 0 1      # Outcome Date GA Lbr [place]/2nd Weight Sex Delivery Anes PTL Lv   3             2  16 34w0d  1.786 kg (3 lb 15 oz) F Vag-Spont EPI Y CARL      Birth Comments: hydrocephalus   shunt   1 SAB 2010 20w0d    SAB   FD      Birth Comments: delivered at home  (was 11)       Nasima Tam, 28 y.o.,  [id number],  presents at 36w3d, complaining of Contractions. They started last night, and became regular around 10 this morning. They are now coming every three minutes. Prenatal course was normal. Please see prenatal records for details. No Known Allergies    Prior to Admission medications    Medication Sig Start Date End Date Taking?  Authorizing Provider   Prenatal Vit-Fe Fumarate-FA (PRENATAL PLUS) 27-1 MG TABS Take by mouth    Historical Provider, MD       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     Cervical high risk HPV (human papillomavirus) test positive     GBS (group B Streptococcus carrier), +RV culture, currently pregnant 2023    H/O premature delivery 2016    34w    Nicotine vapor product user      labor 2016    34w    Pyelectasis of fetus on prenatal ultrasound 3/28/2023    Bilateral 4mm  28wk resolved       Past Surgical History:   Procedure Laterality Date    APPENDECTOMY         Social History     Occupational History    Occupation: Behavioral Therpasist   Tobacco Use    Smoking status: Former     Types: Cigarettes     Quit date: 2019     Years since quitting: 3.6    Smokeless tobacco: Current   Vaping Use    Vaping Use: Former   Substance and Sexual Activity    Alcohol use: Not Currently

## 2023-07-22 NOTE — PROGRESS NOTES
1205: Patient arrives from home to Labor and delivery unit complaining of contractions every 2-3 minutes since 1030 this morning. No vaginal bleeding or leakage of fluid. 1219: Called Dr. Satnam Grubbs at this time to assess patient. 1222: Dr. Satnam Grubbs at the bedside assessing patient at this time. 1227: Cervical exam completed by provider and noted to be 6/100/0.     1313: Dr. Millie Kaufman at the bedside at this time to place epidural. Patient sitting up for epidural placement, nurse at the bedside continuously, difficult to monitor fetus at this time due to patient's position, however nurse staying at the bedside. 1318: Timeout completed at this time. 1322: Test dose completed. 1325: Bolus dose given at this time. 1910: Bedside and Verbal shift change report given to Parish Mario (oncoming nurse) by Otilio Hayes RN (offgoing nurse). Report included the following information Nurse Handoff Report, MAR, and Recent Results.

## 2023-07-22 NOTE — PROGRESS NOTES
7:11 PM  SBAR report received from Kong Eli RN. Assumed care of the patient at this time. 9:00 PM  Dr. Keo Dorado at the bedside to check the patient. 9:28 PM  Dr. Keo Dorado at the bedside due to repetitive decelerations. 10:33 PM  Patient called out sating that she is feeling constant pressure now. 10:35 PM  Small anterior lip noted. It also feels like the patient may have a forebag around the babies head. Will notify Dr. Keo Dorado of these findings. 10:44 PM  Notified Dr. Keo Dorado of the patients exam. He stated she can start pushing. 10:57 PM  Pushing started. 11:48 PM  Dr. Keo Dorado requested to the room for delivery. 12:14 AM   of a viable male infant. 3:10 AM  Using the assistance of the Buzz Layman Steady due to numb and tingling legs, the patient was taken to the bathroom where she voided and madison care was performed. The patient was then taken to a wheelchair with the assistance of the [facility] and was taken to the NICU to see her infant. The patient and the NICU nurse were told to call for me if she became in need of assistance. 3:50 AM  Assisted the patient back to bed after her visit to the NICU.   7:10 AM  SBAR report given to Consuelo Santa RN. Care of the patient turned over at this time.

## 2023-07-22 NOTE — ANESTHESIA PROCEDURE NOTES
Epidural Block    Patient location during procedure: OB  Start time: 7/22/2023 1:18 PM  End time: 7/22/2023 1:25 PM  Reason for block: labor epidural  Staffing  Performed: anesthesiologist   Anesthesiologist: Eva Quintana MD  Epidural  Patient position: sitting  Prep: ChloraPrep  Patient monitoring: continuous pulse ox and frequent blood pressure checks  Approach: midline  Location: L2-3  Injection technique: ARMAND saline  Provider prep: mask and sterile gloves  Needle  Needle type: Tuohy   Needle gauge: 16 G  Catheter type: multi-orifice  Catheter size: 20 G  Test dose: negativeCatheter Secured: tegaderm and tape  Assessment  Hemodynamics: stable  Attempts: 1  Outcomes: uncomplicated and patient tolerated procedure well  Preanesthetic Checklist  Completed: patient identified, IV checked, site marked, risks and benefits discussed, surgical/procedural consents, equipment checked, pre-op evaluation, timeout performed, anesthesia consent given, oxygen available, monitors applied/VS acknowledged, fire risk safety assessment completed and verbalized and blood product R/B/A discussed and consented

## 2023-07-23 PROCEDURE — 6370000000 HC RX 637 (ALT 250 FOR IP): Performed by: OBSTETRICS & GYNECOLOGY

## 2023-07-23 PROCEDURE — 3700000156 HC EPIDURAL ANESTHESIA: Performed by: ANESTHESIOLOGY

## 2023-07-23 PROCEDURE — 7210000100 HC LABOR FEE PER 1 HR: Performed by: OBSTETRICS & GYNECOLOGY

## 2023-07-23 PROCEDURE — 59400 OBSTETRICAL CARE: CPT | Performed by: OBSTETRICS & GYNECOLOGY

## 2023-07-23 PROCEDURE — 0UQMXZZ REPAIR VULVA, EXTERNAL APPROACH: ICD-10-PCS | Performed by: OBSTETRICS & GYNECOLOGY

## 2023-07-23 PROCEDURE — 7220000101 HC DELIVERY VAGINAL/SINGLE: Performed by: OBSTETRICS & GYNECOLOGY

## 2023-07-23 PROCEDURE — 88307 TISSUE EXAM BY PATHOLOGIST: CPT

## 2023-07-23 PROCEDURE — 1120000000 HC RM PRIVATE OB

## 2023-07-23 RX ORDER — OXYCODONE HYDROCHLORIDE 5 MG/1
5 TABLET ORAL EVERY 4 HOURS PRN
Status: DISCONTINUED | OUTPATIENT
Start: 2023-07-23 | End: 2023-07-25 | Stop reason: HOSPADM

## 2023-07-23 RX ORDER — LANOLIN/MINERAL OIL
LOTION (ML) TOPICAL PRN
Status: DISCONTINUED | OUTPATIENT
Start: 2023-07-23 | End: 2023-07-25 | Stop reason: HOSPADM

## 2023-07-23 RX ORDER — DOCUSATE SODIUM 100 MG/1
100 CAPSULE, LIQUID FILLED ORAL 2 TIMES DAILY
Status: DISCONTINUED | OUTPATIENT
Start: 2023-07-23 | End: 2023-07-25 | Stop reason: HOSPADM

## 2023-07-23 RX ORDER — ONDANSETRON 2 MG/ML
4 INJECTION INTRAMUSCULAR; INTRAVENOUS EVERY 6 HOURS PRN
Status: DISCONTINUED | OUTPATIENT
Start: 2023-07-23 | End: 2023-07-25 | Stop reason: HOSPADM

## 2023-07-23 RX ORDER — IBUPROFEN 600 MG/1
600 TABLET ORAL EVERY 8 HOURS SCHEDULED
Status: DISCONTINUED | OUTPATIENT
Start: 2023-07-23 | End: 2023-07-25 | Stop reason: HOSPADM

## 2023-07-23 RX ORDER — METHYLERGONOVINE MALEATE 0.2 MG/ML
200 INJECTION INTRAVENOUS PRN
Status: DISCONTINUED | OUTPATIENT
Start: 2023-07-23 | End: 2023-07-25 | Stop reason: HOSPADM

## 2023-07-23 RX ORDER — TRANEXAMIC ACID 10 MG/ML
1000 INJECTION, SOLUTION INTRAVENOUS
Status: ACTIVE | OUTPATIENT
Start: 2023-07-23 | End: 2023-07-24

## 2023-07-23 RX ORDER — ACETAMINOPHEN 500 MG
1000 TABLET ORAL EVERY 8 HOURS SCHEDULED
Status: DISCONTINUED | OUTPATIENT
Start: 2023-07-23 | End: 2023-07-25 | Stop reason: HOSPADM

## 2023-07-23 RX ORDER — SODIUM CHLORIDE, SODIUM LACTATE, POTASSIUM CHLORIDE, CALCIUM CHLORIDE 600; 310; 30; 20 MG/100ML; MG/100ML; MG/100ML; MG/100ML
INJECTION, SOLUTION INTRAVENOUS CONTINUOUS
Status: DISCONTINUED | OUTPATIENT
Start: 2023-07-23 | End: 2023-07-23

## 2023-07-23 RX ORDER — SODIUM CHLORIDE 0.9 % (FLUSH) 0.9 %
5-40 SYRINGE (ML) INJECTION PRN
Status: DISCONTINUED | OUTPATIENT
Start: 2023-07-23 | End: 2023-07-25 | Stop reason: HOSPADM

## 2023-07-23 RX ORDER — SODIUM CHLORIDE 0.9 % (FLUSH) 0.9 %
5-40 SYRINGE (ML) INJECTION EVERY 12 HOURS SCHEDULED
Status: DISCONTINUED | OUTPATIENT
Start: 2023-07-23 | End: 2023-07-23

## 2023-07-23 RX ORDER — OXYCODONE HYDROCHLORIDE 5 MG/1
10 TABLET ORAL EVERY 4 HOURS PRN
Status: DISCONTINUED | OUTPATIENT
Start: 2023-07-23 | End: 2023-07-25 | Stop reason: HOSPADM

## 2023-07-23 RX ORDER — SODIUM CHLORIDE 9 MG/ML
INJECTION, SOLUTION INTRAVENOUS PRN
Status: DISCONTINUED | OUTPATIENT
Start: 2023-07-23 | End: 2023-07-25 | Stop reason: HOSPADM

## 2023-07-23 RX ORDER — CARBOPROST TROMETHAMINE 250 UG/ML
250 INJECTION, SOLUTION INTRAMUSCULAR PRN
Status: DISCONTINUED | OUTPATIENT
Start: 2023-07-23 | End: 2023-07-25 | Stop reason: HOSPADM

## 2023-07-23 RX ADMIN — OXYCODONE HYDROCHLORIDE 5 MG: 5 TABLET ORAL at 21:23

## 2023-07-23 RX ADMIN — ACETAMINOPHEN 1000 MG: 500 TABLET, FILM COATED ORAL at 17:28

## 2023-07-23 RX ADMIN — IBUPROFEN 600 MG: 600 TABLET, FILM COATED ORAL at 13:52

## 2023-07-23 RX ADMIN — DOCUSATE SODIUM 100 MG: 100 CAPSULE, LIQUID FILLED ORAL at 09:35

## 2023-07-23 RX ADMIN — OXYCODONE HYDROCHLORIDE 5 MG: 5 TABLET ORAL at 17:28

## 2023-07-23 RX ADMIN — ACETAMINOPHEN 1000 MG: 500 TABLET, FILM COATED ORAL at 09:35

## 2023-07-23 RX ADMIN — OXYCODONE HYDROCHLORIDE 5 MG: 5 TABLET ORAL at 12:46

## 2023-07-23 RX ADMIN — IBUPROFEN 600 MG: 600 TABLET, FILM COATED ORAL at 04:02

## 2023-07-23 RX ADMIN — DOCUSATE SODIUM 100 MG: 100 CAPSULE, LIQUID FILLED ORAL at 21:23

## 2023-07-23 RX ADMIN — IBUPROFEN 600 MG: 600 TABLET, FILM COATED ORAL at 21:23

## 2023-07-23 ASSESSMENT — PAIN SCALES - GENERAL
PAINLEVEL_OUTOF10: 4
PAINLEVEL_OUTOF10: 5
PAINLEVEL_OUTOF10: 5
PAINLEVEL_OUTOF10: 4
PAINLEVEL_OUTOF10: 6

## 2023-07-23 ASSESSMENT — PAIN DESCRIPTION - LOCATION
LOCATION: PERINEUM
LOCATION: VAGINA
LOCATION: PERINEUM
LOCATION: ABDOMEN
LOCATION: ABDOMEN

## 2023-07-23 ASSESSMENT — PAIN DESCRIPTION - DESCRIPTORS
DESCRIPTORS: ACHING;SORE;CRAMPING
DESCRIPTORS: SORE
DESCRIPTORS: CRAMPING
DESCRIPTORS: SORE
DESCRIPTORS: CRAMPING

## 2023-07-23 ASSESSMENT — PAIN DESCRIPTION - ORIENTATION
ORIENTATION: LOWER

## 2023-07-23 ASSESSMENT — PAIN - FUNCTIONAL ASSESSMENT
PAIN_FUNCTIONAL_ASSESSMENT: ACTIVITIES ARE NOT PREVENTED

## 2023-07-23 NOTE — L&D DELIVERY NOTE
Delivery Procedure Note    2023    Delivery Physician:  Jayden Jeffries MD    Procedure: Spontaneous vaginal delivery    Anesthesia: Epidural    Monitor:  Fetal Heart Tones-External and Uterine Contractions- External    Estimated Blood Loss: 400    Episiotomy: Episiotomy: None    Laceration(s):  Periurethral    Laceration(s) repair: Yes    Suture used for repair: 3 - 0 Monocryl    Fetal Description: morgan    Fetal Position: Left Occiput Anterior     Interventions: Nasopharyngeal/Oropharyngeal Suctioning, Warming/Drying, Monitoring vital signs, Supplemental oxygen, Bag/Mask Ventilation, and to NICU    Birth:   Baby Weight:   Information for the patient's :  Todd Cameron [[id number]]   Birth Weight: 5 lb 12.1 oz (2.61 kg)     Baby Apgar 1 min:   Information for the patient's :  Todd Cameron [[id number]]   APGAR One: 1     Baby Apgar 5 min:   Information for the patient's :  Todd Cameron [[id number]]   APGAR Five: 6     Baby :   Information for the patient's :  Sriram Castillo [[id number]]   2023     Baby Type of Delivery:   Information for the patient's :  Todd Cameron [[id number]]   Delivery Method: Vaginal, Spontaneous     Baby Gender:   Information for the patient's :  Todd Cameron [[id number]]   male       Umbilical Cord: 3 vessels present    Placenta Removal: expressed    Placenta Appearance: normal intact    Specimens: None           Complications:She passed a large blood clot with delivery of the baby,and prior to placenta. I suspect a placental abruption.       Signed By:  Jayden Jeffries MD     2023

## 2023-07-23 NOTE — PROGRESS NOTES
Labor Progress Note  Patient seen, fetal heart rate and contraction pattern evaluated, patient examined. Patient is comfortable with epidural.  Vitals:    07/22/23 2104   BP:    Pulse:    Resp:    Temp:    SpO2: 100%       Physical Exam:    28 y.o. [id number] in no acute distress.     Cervical Exam:   Presentation Vertex  Dilation 9 cms   Effacement 100 %   Station 0  Membranes: Prior amniotomy: Clear  Uterine Activity:   Frequency: Every 3 minutes   Duration: 60 seconds   Intensity: moderate  Fetal Heart Rate:    Baseline: 145 bpm   Variability: Moderate   Accelerations: Present (15 x 15 bpm)   Decelerations: None  Category: Category 1    Oxytocin (kristen-units/minute): 4      Recent Results (from the past 12 hour(s))   CBC    Collection Time: 07/22/23 12:43 PM   Result Value Ref Range    WBC 17.1 (H) 3.6 - 11.0 K/uL    RBC 4.65 3.80 - 5.20 M/uL    Hemoglobin 14.2 11.5 - 16.0 g/dL    Hematocrit 42.4 35.0 - 47.0 %    MCV 91.2 80.0 - 99.0 FL    MCH 30.5 26.0 - 34.0 PG    MCHC 33.5 30.0 - 36.5 g/dL    RDW 12.8 11.5 - 14.5 %    Platelets 871 002 - 640 K/uL    MPV 9.2 8.9 - 12.9 FL    Nucleated RBCs 0.0 0  WBC    nRBC 0.00 0.00 - 0.01 K/uL   TYPE AND SCREEN    Collection Time: 07/22/23 12:43 PM   Result Value Ref Range    Crossmatch expiration date 07/25/2023,2359     ABO/Rh O POSITIVE     Antibody Screen NEG        Assessment/Plan:     Patient Active Problem List   Diagnosis    Premature labor in third trimester    Streptococcus B carrier state complicating childbirth    36 weeks gestation of pregnancy    Secondary uterine inertia       Streptococcus B carrier state complicating childbirth  Penicillin   Will continue oxytocin  Beverlyn Severance, MD  7/22/2023

## 2023-07-23 NOTE — PROGRESS NOTES
Variable decelerations, just a little cervix all the way around the head. I  could not get an  IUPC in place.   Hopefully, she will be able to push soon

## 2023-07-24 PROCEDURE — 6370000000 HC RX 637 (ALT 250 FOR IP): Performed by: OBSTETRICS & GYNECOLOGY

## 2023-07-24 PROCEDURE — 1120000000 HC RM PRIVATE OB

## 2023-07-24 RX ORDER — IBUPROFEN 200 MG
TABLET ORAL 4 TIMES DAILY
Status: DISCONTINUED | OUTPATIENT
Start: 2023-07-24 | End: 2023-07-25 | Stop reason: HOSPADM

## 2023-07-24 RX ADMIN — OXYCODONE HYDROCHLORIDE 5 MG: 5 TABLET ORAL at 10:27

## 2023-07-24 RX ADMIN — DOCUSATE SODIUM 100 MG: 100 CAPSULE, LIQUID FILLED ORAL at 22:53

## 2023-07-24 RX ADMIN — OXYCODONE HYDROCHLORIDE 5 MG: 5 TABLET ORAL at 23:04

## 2023-07-24 RX ADMIN — ACETAMINOPHEN 1000 MG: 500 TABLET, FILM COATED ORAL at 18:41

## 2023-07-24 RX ADMIN — OXYCODONE HYDROCHLORIDE 5 MG: 5 TABLET ORAL at 06:24

## 2023-07-24 RX ADMIN — IBUPROFEN 600 MG: 600 TABLET, FILM COATED ORAL at 14:18

## 2023-07-24 RX ADMIN — ACETAMINOPHEN 1000 MG: 500 TABLET, FILM COATED ORAL at 01:56

## 2023-07-24 RX ADMIN — OXYCODONE HYDROCHLORIDE 5 MG: 5 TABLET ORAL at 14:18

## 2023-07-24 RX ADMIN — IBUPROFEN 600 MG: 600 TABLET, FILM COATED ORAL at 22:53

## 2023-07-24 RX ADMIN — OXYCODONE HYDROCHLORIDE 5 MG: 5 TABLET ORAL at 01:55

## 2023-07-24 RX ADMIN — DOCUSATE SODIUM 100 MG: 100 CAPSULE, LIQUID FILLED ORAL at 09:28

## 2023-07-24 RX ADMIN — OXYCODONE HYDROCHLORIDE 5 MG: 5 TABLET ORAL at 18:41

## 2023-07-24 RX ADMIN — BACITRACIN ZINC, NEOMYCIN, POLYMYXIN B: 400; 3.5; 5 OINTMENT TOPICAL at 22:54

## 2023-07-24 RX ADMIN — IBUPROFEN 600 MG: 600 TABLET, FILM COATED ORAL at 06:24

## 2023-07-24 RX ADMIN — ACETAMINOPHEN 1000 MG: 500 TABLET, FILM COATED ORAL at 10:27

## 2023-07-24 RX ADMIN — BACITRACIN ZINC, NEOMYCIN, POLYMYXIN B: 400; 3.5; 5 OINTMENT TOPICAL at 14:22

## 2023-07-24 ASSESSMENT — PAIN DESCRIPTION - ORIENTATION
ORIENTATION: MID
ORIENTATION: MID
ORIENTATION: LOWER

## 2023-07-24 ASSESSMENT — PAIN DESCRIPTION - LOCATION
LOCATION: PERINEUM
LOCATION: PERINEUM;VULVA
LOCATION: VAGINA
LOCATION: PERINEUM;ABDOMEN
LOCATION: VAGINA
LOCATION: ABDOMEN

## 2023-07-24 ASSESSMENT — PAIN DESCRIPTION - DESCRIPTORS
DESCRIPTORS: BURNING
DESCRIPTORS: CRAMPING;SORE
DESCRIPTORS: BURNING
DESCRIPTORS: CRAMPING
DESCRIPTORS: DISCOMFORT;TENDER

## 2023-07-24 ASSESSMENT — PAIN - FUNCTIONAL ASSESSMENT
PAIN_FUNCTIONAL_ASSESSMENT: ACTIVITIES ARE NOT PREVENTED
PAIN_FUNCTIONAL_ASSESSMENT: ACTIVITIES ARE NOT PREVENTED

## 2023-07-24 ASSESSMENT — PAIN SCALES - GENERAL
PAINLEVEL_OUTOF10: 4
PAINLEVEL_OUTOF10: 3
PAINLEVEL_OUTOF10: 5
PAINLEVEL_OUTOF10: 4
PAINLEVEL_OUTOF10: 3
PAINLEVEL_OUTOF10: 4
PAINLEVEL_OUTOF10: 5

## 2023-07-24 NOTE — ANESTHESIA POSTPROCEDURE EVALUATION
Department of Anesthesiology  Postprocedure Note    Patient: Darshan Cabral  MRN: [id number]  [facility] [facility] 1995  Date of evaluation: 7/24/2023      Procedure Summary     Date: 07/22/23 Room / Location:     Anesthesia Start: 1318 Anesthesia Stop: 07/23/23 0014    Procedure: Labor Analgesia Diagnosis:     Scheduled Providers:  Responsible Provider: Arabella Branch MD    Anesthesia Type: epidural ASA Status: 2          Anesthesia Type: No value filed.     Blu Phase I:      Blu Phase II:        Anesthesia Post Evaluation    Complications: no

## 2023-07-24 NOTE — CARE COORDINATION
2023  1:56 PM    CM met with JESSICA to complete initial assessment and begin discharge planning. MOB verified and confirmed demographics. JESSICA lives with FOB, at the address on file. JESSICA reports she has good family support, and feels like she has the support she needs when she returns home. JESSICA plans to breast feed baby and has pump to use at home. [facility] will provide follow up care for infant. JESSICA has car seat, bassinet/crib, clothing, bottles and all necessary supplies for baby. JESSICA has [employer] and will be adding baby to this policy. CM discussed process to add baby to insurance, JESISCA verbalized understanding. Baby \"Charles\" born on , 36w4d, admitted to NICU, late  infant  , . Placental abruption. Resp depression at birth.       23 1356   Service Assessment   Patient Orientation Alert and Oriented   Cognition Alert   History Provided By Patient   Primary Caregiver Self   Support Systems Spouse/Significant Other   PCP Verified by CM Yes   Last Visit to PCP Within last 3 months   Prior Functional Level Independent in ADLs/IADLs   Current Functional Level Independent in ADLs/IADLs   Can patient return to prior living arrangement Yes   Ability to make needs known: Good   Family able to assist with home care needs: Yes   Would you like for me to discuss the discharge plan with any other family members/significant others, and if so, who?  No     LEBRON Dhillon

## 2023-07-25 VITALS
RESPIRATION RATE: 16 BRPM | TEMPERATURE: 97.8 F | BODY MASS INDEX: 30.48 KG/M2 | DIASTOLIC BLOOD PRESSURE: 72 MMHG | SYSTOLIC BLOOD PRESSURE: 123 MMHG | WEIGHT: 172 LBS | HEART RATE: 70 BPM | HEIGHT: 63 IN | OXYGEN SATURATION: 98 %

## 2023-07-25 PROCEDURE — 6370000000 HC RX 637 (ALT 250 FOR IP): Performed by: OBSTETRICS & GYNECOLOGY

## 2023-07-25 RX ORDER — OXYCODONE HYDROCHLORIDE 5 MG/1
5 TABLET ORAL EVERY 6 HOURS PRN
Qty: 15 TABLET | Refills: 0 | Status: SHIPPED | OUTPATIENT
Start: 2023-07-25 | End: 2023-08-01

## 2023-07-25 RX ORDER — IBUPROFEN 800 MG/1
800 TABLET ORAL EVERY 6 HOURS PRN
Qty: 60 TABLET | Refills: 1 | Status: SHIPPED | OUTPATIENT
Start: 2023-07-25 | End: 2023-08-24

## 2023-07-25 RX ADMIN — ACETAMINOPHEN 1000 MG: 500 TABLET, FILM COATED ORAL at 03:09

## 2023-07-25 RX ADMIN — OXYCODONE HYDROCHLORIDE 5 MG: 5 TABLET ORAL at 12:44

## 2023-07-25 RX ADMIN — OXYCODONE HYDROCHLORIDE 5 MG: 5 TABLET ORAL at 03:09

## 2023-07-25 RX ADMIN — DOCUSATE SODIUM 100 MG: 100 CAPSULE, LIQUID FILLED ORAL at 08:23

## 2023-07-25 RX ADMIN — OXYCODONE HYDROCHLORIDE 5 MG: 5 TABLET ORAL at 08:23

## 2023-07-25 RX ADMIN — IBUPROFEN 600 MG: 600 TABLET, FILM COATED ORAL at 06:40

## 2023-07-25 ASSESSMENT — PAIN DESCRIPTION - ORIENTATION
ORIENTATION: MID
ORIENTATION: MID
ORIENTATION: ANTERIOR;LOWER

## 2023-07-25 ASSESSMENT — PAIN DESCRIPTION - LOCATION
LOCATION: VAGINA
LOCATION: VAGINA
LOCATION: ABDOMEN;PERINEUM

## 2023-07-25 ASSESSMENT — PAIN SCALES - GENERAL
PAINLEVEL_OUTOF10: 4
PAINLEVEL_OUTOF10: 2
PAINLEVEL_OUTOF10: 3

## 2023-07-25 ASSESSMENT — PAIN DESCRIPTION - DESCRIPTORS
DESCRIPTORS: TENDER
DESCRIPTORS: TENDER
DESCRIPTORS: ACHING;CRAMPING

## 2023-07-25 NOTE — PROGRESS NOTES
Pt. Off unit in stable condition via wheelchair with nurse for discharge home per Dr. Hali Cassidy . Pt. Is to follow up in 6 weeks and is aware. Prescriptions given to pt. Pt. Denies any headache, dizziness, nausea, vomiting, or pain at this time. Infant in car seat with mother.

## 2023-07-25 NOTE — PROGRESS NOTES
1930: Bedside and Verbal shift change report given to Adrianna Ta RN (oncoming nurse) by Rommel Machado RN (offgoing nurse). Report included the following information Nurse Handoff Report, MAR, Recent Results, and Med Rec Status. 0700: Bedside and Verbal shift change report given to Araseli CLINTON (oncoming nurse) by Karma Flor  (offgoing nurse). Report included the following information Nurse Handoff Report, MAR, Recent Results, and Med Rec Status.

## 2023-07-25 NOTE — LACTATION NOTE
This note was copied from a baby's chart. Patient has been pumping. Has not pumped this am yet. Plans to attempt to feed baby at [facility] today.

## 2023-07-25 NOTE — PLAN OF CARE
Problem: Pain  Goal: Verbalizes/displays adequate comfort level or baseline comfort level  Outcome: Progressing     Problem: Postpartum  Goal: Experiences normal postpartum course  Description:  Postpartum OB-Pregnancy care plan goal which identifies if the mother is experiencing a normal postpartum course  Outcome: Progressing  Goal: Appropriate maternal -  bonding  Description:  Postpartum OB-Pregnancy care plan goal which identifies if the mother and  are bonding appropriately  Outcome: Progressing  Goal: Establishment of infant feeding pattern  Description:  Postpartum OB-Pregnancy care plan goal which identifies if the mother is establishing a feeding pattern with their   Outcome: Progressing  Goal: Incisions, wounds, or drain sites healing without S/S of infection  Outcome: Progressing     Problem: Safety - Adult  Goal: Free from fall injury  Outcome: Progressing     Problem: Skin/Tissue Integrity  Goal: Absence of new skin breakdown  Description: 1. Monitor for areas of redness and/or skin breakdown  2. Assess vascular access sites hourly  3. Every 4-6 hours minimum:  Change oxygen saturation probe site  4. Every 4-6 hours:  If on nasal continuous positive airway pressure, respiratory therapy assess nares and determine need for appliance change or resting period.   Outcome: Progressing

## 2023-09-07 NOTE — PROGRESS NOTES
Michael Dodson is a 33 y.o. female returns for a routine post-partum follow-up visit     Chief Complaint   Patient presents with    Postpartum Care       Postpartum Depression: Low Risk     Last EPDS Total Score: 2    Last EPDS Self Harm Result: Never       Current EPDS score:      Type of delivery: normal spontaneous vaginal delivery  Date of Delivery: July 23, 2023  Breastfeeding: no  Bleeding Resolved: yes  Birth Control: wants OCP. Last Pap: normal obtained 1/10/2023. Problems: no problems    1. Have you been to the ER, urgent care clinic, or hospitalized since your last visit? No    2. Have you seen or consulted any other health care providers outside of the [address] [place] [address] Avenue since your last visit? No    Examination chaperoned by Jose Antonio Taylor CMA.

## 2023-09-08 ENCOUNTER — OFFICE VISIT (OUTPATIENT)
Age: 28
End: 2023-09-08

## 2023-09-08 VITALS — DIASTOLIC BLOOD PRESSURE: 81 MMHG | WEIGHT: 143.8 LBS | SYSTOLIC BLOOD PRESSURE: 134 MMHG | BODY MASS INDEX: 25.47 KG/M2

## 2023-09-08 PROBLEM — O60.03 PREMATURE LABOR IN THIRD TRIMESTER: Status: RESOLVED | Noted: 2023-07-22 | Resolved: 2023-09-08

## 2023-09-08 RX ORDER — ETHYNODIOL DIACETATE AND ETHINYL ESTRADIOL 1 MG-35MCG
1 KIT ORAL DAILY
Qty: 1 PACKET | Refills: 3 | Status: SHIPPED | OUTPATIENT
Start: 2023-09-08

## 2023-09-08 NOTE — PROGRESS NOTES
POSTPARTUM EVALUATION      Riddhi Villatoro is a 33y.o. year old  White (non-) female who presents for a postpartum exam.     She is now six weeks post normal spontaneous vaginal delivery. Her baby is doing well. She has had no menses since delivery. She has had the following significant problems since her delivery: none    The patient is breast feeding without difficulty. The patient would like to use OCP for birth control. She is currently taking:   ethynodiol-ethinyl estradiol  Prenatal Plus Tabs     EPDS score is: 0    She is due for her next AE in 3 months. Problem List:  There are no problems to display for this patient.     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     Cervical high risk HPV (human papillomavirus) test positive     GBS (group B Streptococcus carrier), +RV culture, currently pregnant 2023    H/O premature delivery 2016    34w    Nicotine vapor product user     Placental abruption 2023    @ delivery     labor 2016    34w    Pyelectasis of fetus on prenatal ultrasound 2023    Bilateral 4mm  28wk resolved    Streptococcus B carrier state complicating childbirth      Past Surgical History:   Procedure Laterality Date    APPENDECTOMY         OB History    Para Term  AB Living   3 3 0 3 1 2   SAB IAB Ectopic Molar Multiple Live Births   1 0 0 0 0 2      # Outcome Date GA Lbr Quincy/2nd Weight Sex Delivery Anes PTL Lv   3  23 36w4d 12:27 / 01:17 5 lb 12.1 oz (2.61 kg) M Vag-Spont EPI Y CARL      Complications: Abruptio Placenta      Name: Carmencita Justice: 1  Apgar5: 6   2  16 34w0d  3 lb 15 oz (1.786 kg) F Vag-Spont EPI Y CARL      Birth Comments: hydrocephalus   shunt   1 SAB 2010 20w0d    SAB   FD      Birth Comments: delivered at home  (was 11)     Social History     Socioeconomic History    Marital status: Single     Spouse name:

## 2023-10-02 ENCOUNTER — TELEPHONE (OUTPATIENT)
Age: 28
End: 2023-10-02

## 2023-10-02 RX ORDER — ETHYNODIOL DIACETATE AND ETHINYL ESTRADIOL 1 MG-35MCG
KIT ORAL
Qty: 3 PACKET | Refills: 1 | Status: SHIPPED | OUTPATIENT
Start: 2023-10-02

## 2023-10-02 NOTE — TELEPHONE ENCOUNTER
Two patient identifiers used        33year old patient last seen in the office on 9/8/2023 for pp visit    Patient has upcoming appointment on 1/2/2024 for ae    Patient calling to say that she is skipping the place diamond pills every 2-3 months and is coming up short with needing her ocp refilled sooner    Please advise if ok to resend rx with new signature to get patient to her scheduled ae appointment    Rx pended with new signature    Thank you      Please review    Thank youo

## 2023-10-02 NOTE — TELEPHONE ENCOUNTER
This nurse attempted to reach the patient and left a detailed message regarding Md sent prescription with new signature

## 2024-01-14 ENCOUNTER — OFFICE VISIT (OUTPATIENT)
Age: 29
End: 2024-01-14

## 2024-01-14 VITALS
DIASTOLIC BLOOD PRESSURE: 86 MMHG | TEMPERATURE: 98.9 F | BODY MASS INDEX: 23.39 KG/M2 | OXYGEN SATURATION: 99 % | WEIGHT: 132 LBS | SYSTOLIC BLOOD PRESSURE: 134 MMHG | HEIGHT: 63 IN | RESPIRATION RATE: 16 BRPM | HEART RATE: 92 BPM

## 2024-01-14 DIAGNOSIS — M25.562 ACUTE PAIN OF LEFT KNEE: Primary | ICD-10-CM

## 2024-01-14 RX ORDER — PREDNISONE 10 MG/1
TABLET ORAL
Qty: 1 EACH | Refills: 0 | Status: SHIPPED | OUTPATIENT
Start: 2024-01-14

## 2024-01-14 RX ORDER — KETOROLAC TROMETHAMINE 30 MG/ML
30 INJECTION, SOLUTION INTRAMUSCULAR; INTRAVENOUS ONCE
Status: COMPLETED | OUTPATIENT
Start: 2024-01-14 | End: 2024-01-14

## 2024-01-14 RX ADMIN — KETOROLAC TROMETHAMINE 30 MG: 30 INJECTION, SOLUTION INTRAMUSCULAR; INTRAVENOUS at 17:21

## 2024-04-25 ENCOUNTER — TELEPHONE (OUTPATIENT)
Age: 29
End: 2024-04-25

## 2024-04-25 RX ORDER — ETHYNODIOL DIACETATE AND ETHINYL ESTRADIOL 1 MG-35MCG
KIT ORAL
Qty: 84 TABLET | Refills: 0 | Status: SHIPPED | OUTPATIENT
Start: 2024-04-25

## 2024-04-25 NOTE — TELEPHONE ENCOUNTER
Two patient identifiers used    28 year old patient last seen in the office on 9/8/2023 for pp visit.    Patient has been scheduled for ae 4 times and currently does not have scheduled appointments    Requested prescription last sent on 10/2/2023 for 6 months      Please advise regarding refill request        EPDS score is: 0     She is due for her next AE in 3 months.       Patient was sent a my chart message regarding need to set up appointment for ae        Thank you

## 2024-05-16 NOTE — PROGRESS NOTES
Jayla Viramontes is a 28 y.o. female returns for an annual exam     Chief Complaint   Patient presents with    Annual Exam       Patient's last menstrual period was 04/09/2024.  Her periods are light in flow and usually regular with a 26-32 day interval with 3-7 day duration.  She does not have dysmenorrhea.  Problems: no problems  Birth Control: OCP (estrogen/progesterone).  Last Pap: 1/10/2023 negative   She does not have a history of GREGORY 2, 3 or cervical cancer.   With regard to the Gardisil vaccine, she has received all 3 injections        Examination chaperoned by Dionna Lombardi MA.

## 2024-05-17 ENCOUNTER — OFFICE VISIT (OUTPATIENT)
Age: 29
End: 2024-05-17
Payer: MEDICAID

## 2024-05-17 VITALS — WEIGHT: 126.6 LBS | DIASTOLIC BLOOD PRESSURE: 67 MMHG | BODY MASS INDEX: 22.43 KG/M2 | SYSTOLIC BLOOD PRESSURE: 119 MMHG

## 2024-05-17 DIAGNOSIS — F41.1 GENERALIZED ANXIETY DISORDER: ICD-10-CM

## 2024-05-17 DIAGNOSIS — Z01.419 ENCNTR FOR GYN EXAM (GENERAL) (ROUTINE) W/O ABN FINDINGS: Primary | ICD-10-CM

## 2024-05-17 PROCEDURE — 99395 PREV VISIT EST AGE 18-39: CPT | Performed by: OBSTETRICS & GYNECOLOGY

## 2024-05-17 RX ORDER — ETHYNODIOL DIACETATE AND ETHINYL ESTRADIOL 1 MG-35MCG
KIT ORAL
Qty: 84 TABLET | Refills: 3 | Status: SHIPPED | OUTPATIENT
Start: 2024-05-17 | End: 2024-08-09

## 2024-05-17 ASSESSMENT — PATIENT HEALTH QUESTIONNAIRE - PHQ9
SUM OF ALL RESPONSES TO PHQ9 QUESTIONS 1 & 2: 0
SUM OF ALL RESPONSES TO PHQ QUESTIONS 1-9: 0
2. FEELING DOWN, DEPRESSED OR HOPELESS: NOT AT ALL
SUM OF ALL RESPONSES TO PHQ QUESTIONS 1-9: 0
1. LITTLE INTEREST OR PLEASURE IN DOING THINGS: NOT AT ALL
SUM OF ALL RESPONSES TO PHQ QUESTIONS 1-9: 0
2. FEELING DOWN, DEPRESSED OR HOPELESS: NOT AT ALL
SUM OF ALL RESPONSES TO PHQ QUESTIONS 1-9: 0
1. LITTLE INTEREST OR PLEASURE IN DOING THINGS: NOT AT ALL
SUM OF ALL RESPONSES TO PHQ9 QUESTIONS 1 & 2: 0
SUM OF ALL RESPONSES TO PHQ QUESTIONS 1-9: 0
SUM OF ALL RESPONSES TO PHQ QUESTIONS 1-9: 0

## 2024-05-17 NOTE — PROGRESS NOTES
ANNUAL EXAM AGES 18-39    History:  Jayla Hatch is a 28 y.o. year old  White (non-) female   Patient's last menstrual period was 2024.    She presents for her annual checkup.     Patient's last menstrual period was 2024.  Her periods are light in flow and usually regular with a 28 day interval with 3 day duration.  She does not have dysmenorrhea.  Problems: no problems  Birth Control: OCP (estrogen/progesterone).  Last Pap: 1/10/2023 negative   She does not have a history of GREGORY 2, 3 or cervical cancer.   With regard to the Gardisil vaccine, she has received all 3 injections    Medical History:  Problem List:  Patient Active Problem List    Diagnosis Date Noted    Generalized anxiety disorder 2023     Overview Note:     Tried Zoloft -- got diarrhea and couldn't sleep.  Advised that is normal at first.  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     Cervical high risk HPV (human papillomavirus) test positive     GBS (group B Streptococcus carrier), +RV culture, currently pregnant 2023    H/O premature delivery 2016    34w    Nicotine vapor product user     Placental abruption 2023    @ delivery     labor 2016    34w    Pyelectasis of fetus on prenatal ultrasound 2023    Bilateral 4mm  28wk resolved    Streptococcus B carrier state complicating childbirth 2023     Past Surgical History:   Procedure Laterality Date    APPENDECTOMY         OB History    Para Term  AB Living   3 3 0 3 1 2   SAB IAB Ectopic Molar Multiple Live Births   1 0 0 0 0 2      # Outcome Date GA Lbr Quincy/2nd Weight Sex Delivery Anes PTL Lv   3  23 36w4d 12:27 / 01:17 2.61 kg (5 lb 12.1 oz) M Vag-Spont EPI Y CARL      Complications: Abruptio Placenta      Name: LIZZY HATCH      Apgar1: 1  Apgar5: 6   2  16 34w0d  1.786 kg (3 lb 15 oz) F Vag-Spont

## 2024-07-22 ENCOUNTER — TELEPHONE (OUTPATIENT)
Age: 29
End: 2024-07-22

## 2024-07-22 NOTE — TELEPHONE ENCOUNTER
----- Message from Heather Villagomez sent at 7/19/2024  3:40 PM EDT -----  Regarding: FW: [facility] Appointment Request    ----- Message -----  From: Darío Negron  Sent: 7/19/2024   9:22 AM EDT  To: [facility] Staff  Subject: [facility] Appointment Request                          [facility] Appointment Request    Patient needs appointment for [facility] Appointment Type: New Patient.    Patient Requested Dates(s):  Patient Requested Time:  Provider Name:    Reason for Appointment Request: New Patient - Available appointments did not meet patient need. Old pcp is retired / discoloration of big toes.  --------------------------------------------------------------------------------------------------------------------------    Relationship to Patient: Self     Call Back Information: OK to leave message on voicemail  Preferred Call Back Number: Phone [phone]

## 2024-08-08 ENCOUNTER — OFFICE VISIT (OUTPATIENT)
Age: 29
End: 2024-08-08
Payer: MEDICAID

## 2024-08-08 VITALS
BODY MASS INDEX: 18.64 KG/M2 | DIASTOLIC BLOOD PRESSURE: 62 MMHG | SYSTOLIC BLOOD PRESSURE: 102 MMHG | TEMPERATURE: 98.2 F | HEIGHT: 68 IN | OXYGEN SATURATION: 98 % | HEART RATE: 90 BPM | RESPIRATION RATE: 16 BRPM | WEIGHT: 123 LBS

## 2024-08-08 DIAGNOSIS — B35.1 ONYCHOMYCOSIS: ICD-10-CM

## 2024-08-08 DIAGNOSIS — Z00.00 WELL WOMAN EXAM WITHOUT GYNECOLOGICAL EXAM: Primary | ICD-10-CM

## 2024-08-08 PROCEDURE — 99213 OFFICE O/P EST LOW 20 MIN: CPT | Performed by: FAMILY MEDICINE

## 2024-08-08 PROCEDURE — 99395 PREV VISIT EST AGE 18-39: CPT | Performed by: FAMILY MEDICINE

## 2024-08-08 SDOH — ECONOMIC STABILITY: FOOD INSECURITY: WITHIN THE PAST 12 MONTHS, YOU WORRIED THAT YOUR FOOD WOULD RUN OUT BEFORE YOU GOT MONEY TO BUY MORE.: NEVER TRUE

## 2024-08-08 SDOH — ECONOMIC STABILITY: HOUSING INSECURITY
IN THE LAST 12 MONTHS, WAS THERE A TIME WHEN YOU DID NOT HAVE A STEADY PLACE TO SLEEP OR SLEPT IN A SHELTER (INCLUDING NOW)?: NO

## 2024-08-08 SDOH — ECONOMIC STABILITY: FOOD INSECURITY: WITHIN THE PAST 12 MONTHS, THE FOOD YOU BOUGHT JUST DIDN'T LAST AND YOU DIDN'T HAVE MONEY TO GET MORE.: NEVER TRUE

## 2024-08-08 SDOH — ECONOMIC STABILITY: INCOME INSECURITY: HOW HARD IS IT FOR YOU TO PAY FOR THE VERY BASICS LIKE FOOD, HOUSING, MEDICAL CARE, AND HEATING?: NOT HARD AT ALL

## 2024-08-08 ASSESSMENT — PATIENT HEALTH QUESTIONNAIRE - PHQ9
SUM OF ALL RESPONSES TO PHQ QUESTIONS 1-9: 0
1. LITTLE INTEREST OR PLEASURE IN DOING THINGS: NOT AT ALL
2. FEELING DOWN, DEPRESSED OR HOPELESS: NOT AT ALL
SUM OF ALL RESPONSES TO PHQ9 QUESTIONS 1 & 2: 0

## 2024-08-08 NOTE — PROGRESS NOTES
Identified pt with two pt identifiers(name and ).    Chief Complaint   Patient presents with    Establish Care    Nail Problem     Both big toes.         Health Maintenance Due   Topic    Hepatitis B vaccine (1 of 3 - 3-dose series)    COVID-19 Vaccine (1)    Varicella vaccine (1 of 2 - 2-dose childhood series)    Flu vaccine (1)       Wt Readings from Last 3 Encounters:   24 55.8 kg (123 lb)   24 57.4 kg (126 lb 9.6 oz)   24 59.9 kg (132 lb)     Temp Readings from Last 3 Encounters:   24 98.2 °F (36.8 °C) (Temporal)   24 98.9 °F (37.2 °C) (Temporal)   23 97.8 °F (36.6 °C) (Oral)     BP Readings from Last 3 Encounters:   24 102/62   24 119/67   24 134/86     Pulse Readings from Last 3 Encounters:   24 90   24 92   23 70           Depression Screening:  :         2024    11:18 AM 2024     1:33 PM 2024     1:28 PM 12/15/2022     3:00 PM   PHQ-9 Questionaire   Little interest or pleasure in doing things 0 0 0 0   Feeling down, depressed, or hopeless 0 0 0 0   PHQ-9 Total Score 0 0 0 0        Fall Risk Assessment:  :          No data to display                 Abuse Screening:  :          No data to display                 Coordination of Care Questionnaire:  :     \"Have you been to the ER, urgent care clinic since your last visit?  Hospitalized since your last visit?\"    NO    “Have you seen or consulted any other health care providers outside of [facility] since your last visit?”    NO            Click Here for Release of Records Request

## 2024-08-08 NOTE — PROGRESS NOTES
[facility]  [address] [place] [address]  Phone: [phone]  Fax: [phone]        Chief Complaint   Patient presents with    Establish Care    Nail Problem     Both big toes.      She is a 28 y.o. female who presents for establish care.  Known to [facility].  Former patient of [facility].        Reports that periods are random as she often manipulates with the OCPs.  Gyn follows this.  Was on depo in the past, but had significant bleeding from this.    Follows with Gyn--Dr. Viramontes.  Gets pap through this group.    Biggest concern today is overgrowth and thickening of her bilateral great toenails.  There is no associated pain. Has been going on for a few months.  Tried OTC anti-fungals/preps--nothing seems to help.    Prior to Visit Medications    Medication Sig Taking? Authorizing Provider   Efinaconazole 10 % SOLN Apply 1 Application topically daily Yes Tano Lopez MD   ethynodiol-ethinyl estradiol (KELNOR ) 1-35 MG-MCG per tablet Take 1 tablet by mouth once daily Yes Doug Viramontes II, MD   ibuprofen (ADVIL;MOTRIN) 800 MG tablet Take 1 tablet by mouth every 6 hours as needed for Pain  Doug Viramontes II, MD       No Known Allergies      Reviewed PmHx, RxHx, FmHx, SocHx, AllgHx and updated and dated in the chart.      Objective:     Vitals:    24 1119   BP: 102/62   Site: Left Upper Arm   Position: Sitting   Cuff Size: Large Adult   Pulse: 90   Resp: 16   Temp: 98.2 °F (36.8 °C)   TempSrc: Temporal   SpO2: 98%   Weight: 55.8 kg (123 lb)   Height: 1.727 m (5' 8\")     Physical Examination:    Physical Exam  Vitals and nursing note reviewed.   Constitutional:       General: She is not in acute distress.     Appearance: Normal appearance.   HENT:      Head: Normocephalic and atraumatic.      Right Ear: Tympanic membrane, ear canal and external ear normal.      Left Ear: Tympanic membrane, ear canal and external ear normal.      Nose: Nose normal.      Mouth/Throat:

## 2024-08-09 ENCOUNTER — TELEPHONE (OUTPATIENT)
Age: 29
End: 2024-08-09

## 2024-08-09 DIAGNOSIS — B35.1 ONYCHOMYCOSIS: Primary | ICD-10-CM

## 2024-08-09 RX ORDER — CICLOPIROX 80 MG/ML
SOLUTION TOPICAL
Qty: 6 ML | Refills: 2 | Status: SHIPPED | OUTPATIENT
Start: 2024-08-09

## 2024-08-09 NOTE — TELEPHONE ENCOUNTER
Patients insurance is asking for an alternative to Efinaconazole 10 % SOLN or if you can provide a PA because the solution is $2,000.      [facility] [address] [place] [address] - [facility] OSCAR SHOOK - P [phone] - F [phone]

## 2024-10-01 NOTE — DISCHARGE SUMMARY
Obstetrical Discharge Summary     Name: Maria Isabel Steele MRN: [id number]  SSN: [id number]    YOB: 1995  Age: 28 y.o. Sex: female      Admit Date: 7/22/2023    Discharge Date: 7/25/2023     Admitting Physician: Johanna Oliva MD     Attending Physician:  Johanny Lance MD     Admission Diagnoses: Premature labor in third trimester [O60.03]  Discharge Diagnoses:   Premature labor in third trimester [O60.03]    Additional Diagnoses: Principal Problem:    Premature labor in third trimester  Active Problems:    Streptococcus B carrier state complicating childbirth    36 weeks gestation of pregnancy    Secondary uterine inertia  Resolved Problems:    * No resolved hospital problems. *       Immunization(s):   Immunization History   Administered Date(s) Administered    TDaP, ADACEL (age 6y-58y), BOOSTRIX (age 10y+), IM, 0.5mL 05/23/2023        Hospital Course: Normal hospital course following the delivery. The patient was released to her home in good condition. Patient Instructions:     Reference my discharge instructions. Current Discharge Medication List        START taking these medications    Details   oxyCODONE (ROXICODONE) 5 MG immediate release tablet Take 1 tablet by mouth every 6 hours as needed for Pain for up to 7 days. Max Daily Amount: 20 mg  Qty: 15 tablet, Refills: 0  Start date: 7/25/2023, End date: 8/1/2023    Comments: Reduce doses taken as pain becomes manageable  Associated Diagnoses: Postpartum pain      ibuprofen (ADVIL;MOTRIN) 800 MG tablet Take 1 tablet by mouth every 6 hours as needed for Pain  Qty: 60 tablet, Refills: 1  Start date: 7/25/2023, End date: 8/24/2023           CONTINUE these medications which have NOT CHANGED    Details   Prenatal Vit-Fe Fumarate-FA (PRENATAL PLUS) 27-1 MG TABS Take by mouth             I spent 10 minutes discharging the patient in face to face contact.     Follow-up Information       Follow up With Specialties Details Why Contact Info Normal muscle tone/strength Unable to assess

## 2025-02-10 ENCOUNTER — OFFICE VISIT (OUTPATIENT)
Age: 30
End: 2025-02-10
Payer: MEDICAID

## 2025-02-10 VITALS
OXYGEN SATURATION: 98 % | DIASTOLIC BLOOD PRESSURE: 68 MMHG | HEART RATE: 96 BPM | TEMPERATURE: 97.4 F | BODY MASS INDEX: 21.51 KG/M2 | RESPIRATION RATE: 16 BRPM | WEIGHT: 126 LBS | HEIGHT: 64 IN | SYSTOLIC BLOOD PRESSURE: 118 MMHG

## 2025-02-10 DIAGNOSIS — L01.00 IMPETIGO: Primary | ICD-10-CM

## 2025-02-10 PROCEDURE — 99213 OFFICE O/P EST LOW 20 MIN: CPT | Performed by: FAMILY MEDICINE

## 2025-02-10 RX ORDER — MUPIROCIN 20 MG/G
OINTMENT TOPICAL
Qty: 22 G | Refills: 0 | Status: SHIPPED | OUTPATIENT
Start: 2025-02-10 | End: 2025-02-17

## 2025-02-10 SDOH — ECONOMIC STABILITY: FOOD INSECURITY: WITHIN THE PAST 12 MONTHS, THE FOOD YOU BOUGHT JUST DIDN'T LAST AND YOU DIDN'T HAVE MONEY TO GET MORE.: NEVER TRUE

## 2025-02-10 SDOH — ECONOMIC STABILITY: FOOD INSECURITY: WITHIN THE PAST 12 MONTHS, YOU WORRIED THAT YOUR FOOD WOULD RUN OUT BEFORE YOU GOT MONEY TO BUY MORE.: NEVER TRUE

## 2025-02-10 ASSESSMENT — PATIENT HEALTH QUESTIONNAIRE - PHQ9
2. FEELING DOWN, DEPRESSED OR HOPELESS: NOT AT ALL
1. LITTLE INTEREST OR PLEASURE IN DOING THINGS: NOT AT ALL
SUM OF ALL RESPONSES TO PHQ QUESTIONS 1-9: 0
SUM OF ALL RESPONSES TO PHQ QUESTIONS 1-9: 0
SUM OF ALL RESPONSES TO PHQ9 QUESTIONS 1 & 2: 0
SUM OF ALL RESPONSES TO PHQ QUESTIONS 1-9: 0
SUM OF ALL RESPONSES TO PHQ QUESTIONS 1-9: 0

## 2025-02-10 NOTE — PROGRESS NOTES
[facility]  [address] [place] [address]  Phone: [phone]  Fax: [phone]        Chief Complaint   Patient presents with    Oral Swelling     Lip pain and swelling on the right side that stings and burns     She is a 29 y.o. female who presents for acute visit.  Presents with pain and swelling on the right corner of her lips.  Has been worsening over the past 3 days.  She has tried multiple chap sticks and topical antifungals. Nothing really helping.  There is increased drainage from the area.  No fever or systemic symptoms. No history of oral HSV.    Prior to Visit Medications    Medication Sig Taking? Authorizing Provider   mupirocin (BACTROBAN) 2 % ointment Apply topically 3 times daily. Yes Tano Lopez MD   ethynodiol-ethinyl estradiol (KELNOR 1/35) 1-35 MG-MCG per tablet Take 1 tablet by mouth once daily  Doug Viramontes II, MD   ibuprofen (ADVIL;MOTRIN) 800 MG tablet Take 1 tablet by mouth every 6 hours as needed for Pain  Doug Viramontes II, MD       No Known Allergies      Reviewed PmHx, RxHx, FmHx, SocHx, AllgHx and updated and dated in the chart.      Objective:     Vitals:    02/10/25 1535   BP: 118/68   Site: Left Upper Arm   Position: Sitting   Cuff Size: Medium Adult   Pulse: 96   Resp: 16   Temp: 97.4 °F (36.3 °C)   TempSrc: Temporal   SpO2: 98%   Weight: 57.2 kg (126 lb)   Height: 1.626 m (5' 4\")     Physical Examination:    Physical Exam  Vitals and nursing note reviewed.   Constitutional:       General: She is not in acute distress.  HENT:      Head: Normocephalic.      Right Ear: Tympanic membrane, ear canal and external ear normal. There is no impacted cerumen.      Left Ear: Tympanic membrane, ear canal and external ear normal. There is no impacted cerumen.      Nose: Nose normal. No congestion.      Mouth/Throat:      Lips: Lesions (right sided angular chelitis with surrounding erythema and honey colored crusting) present.      Mouth: Mucous membranes

## 2025-02-10 NOTE — PROGRESS NOTES
Identified pt with two pt identifiers(name and )    Chief Complaint   Patient presents with    Oral Swelling     Lip pain and swelling on the right side that stings and burns        Health Maintenance Due   Topic    Varicella vaccine (1 of  - 13+ 2-dose series)    Hepatitis B vaccine (1 of 3 - 19+ 3-dose series)    Flu vaccine (1)    COVID-19 Vaccine ( season)       Wt Readings from Last 3 Encounters:   02/10/25 57.2 kg (126 lb)   24 55.8 kg (123 lb)   24 57.4 kg (126 lb 9.6 oz)     Temp Readings from Last 3 Encounters:   02/10/25 97.4 °F (36.3 °C) (Temporal)   24 98.2 °F (36.8 °C) (Temporal)   24 98.9 °F (37.2 °C) (Temporal)     BP Readings from Last 3 Encounters:   02/10/25 118/68   24 102/62   24 119/67     Pulse Readings from Last 3 Encounters:   02/10/25 96   24 90   24 92           Depression Screening:  :         2/10/2025     3:34 PM 2024    11:18 AM 2024     1:33 PM 2024     1:28 PM 12/15/2022     3:00 PM   PHQ-9 Questionaire   Little interest or pleasure in doing things 0 0 0 0 0   Feeling down, depressed, or hopeless 0 0 0 0 0   PHQ-9 Total Score 0 0 0 0 0        Fall Risk Assessment:  :          No data to display                 Abuse Screening:  :          No data to display                 Coordination of Care Questionnaire:  :     \"Have you been to the ER, urgent care clinic since your last visit?  Hospitalized since your last visit?\"    NO    “Have you seen or consulted any other health care providers outside our system since your last visit?”    NO        Click Here for Release of Records Request

## 2025-02-12 ENCOUNTER — PATIENT MESSAGE (OUTPATIENT)
Age: 30
End: 2025-02-12

## 2025-02-12 DIAGNOSIS — L01.00 IMPETIGO: Primary | ICD-10-CM

## 2025-02-12 RX ORDER — CEPHALEXIN 500 MG/1
500 CAPSULE ORAL 4 TIMES DAILY
Qty: 40 CAPSULE | Refills: 0 | Status: SHIPPED | OUTPATIENT
Start: 2025-02-12

## 2025-04-07 ENCOUNTER — TELEPHONE (OUTPATIENT)
Age: 30
End: 2025-04-07

## 2025-04-07 RX ORDER — ETHYNODIOL DIACETATE AND ETHINYL ESTRADIOL 1 MG-35MCG
KIT ORAL
Qty: 4 PACKET | Refills: 3 | Status: SHIPPED | OUTPATIENT
Start: 2025-04-07 | End: 2025-06-30

## 2025-04-07 NOTE — TELEPHONE ENCOUNTER
PT was called back, name and  verified    RN relayed MD corrected the signature/instructions and sent the refills to the preferred pharmacy.  PT verbalizes understanding.

## 2025-05-19 ENCOUNTER — OFFICE VISIT (OUTPATIENT)
Age: 30
End: 2025-05-19
Payer: MEDICAID

## 2025-05-19 VITALS
HEIGHT: 64 IN | SYSTOLIC BLOOD PRESSURE: 114 MMHG | WEIGHT: 134.2 LBS | HEART RATE: 98 BPM | RESPIRATION RATE: 18 BRPM | DIASTOLIC BLOOD PRESSURE: 74 MMHG | BODY MASS INDEX: 22.91 KG/M2

## 2025-05-19 DIAGNOSIS — Z01.419 WELL WOMAN EXAM WITH ROUTINE GYNECOLOGICAL EXAM: Primary | ICD-10-CM

## 2025-05-19 PROCEDURE — 99395 PREV VISIT EST AGE 18-39: CPT | Performed by: OBSTETRICS & GYNECOLOGY

## 2025-05-19 RX ORDER — ETHYNODIOL DIACETATE AND ETHINYL ESTRADIOL 1 MG-35MCG
KIT ORAL
Qty: 4 PACKET | Refills: 3 | Status: SHIPPED | OUTPATIENT
Start: 2025-05-19 | End: 2025-08-11

## 2025-05-19 NOTE — PROGRESS NOTES
Jayla Viramontes is a 29 y.o. female returns for an annual exam     Chief Complaint   Patient presents with    Annual Exam       No LMP recorded. (Menstrual status: Other - See Notes).  Her periods are absent in flow .  Problems: no problems  Birth Control: OCP (estrogen/progesterone) takes continuously.    Last Pap:  normal obtained 5/17/2024.  She does not have a history of GREGORY 2, 3 or cervical cancer.   With regard to the Gardisil vaccine, she has received all 3 injections      1. Have you been to the ER, urgent care clinic, or hospitalized since your last visit? No    2. Have you seen or consulted any other health care providers outside of the [facility] since your last visit? Yes    Examination chaperoned by ANY JOSEPH CMA.  
    Assessment & Plan  1. Well-woman exam: Stable.  - Continue the current regimen of birth control pills.  - Allow for a menstrual cycle every 4 to 5 months.  - Prescription refill for birth control pills.    Follow-up  - Follow up in 1 year, or earlier if any complications arise.       Assessment:   Diagnosis Orders   1. Well woman exam with routine gynecological exam  PAP IG, Aptima HPV and rfx 16/18,45 ([id number])          Plan:  Counseled re: diet, exercise, healthy lifestyle  Rec screening mammo at 39  Return in about 1 year (around 5/19/2026) for Annual.  Data Unavailable    
PAST MEDICAL HISTORY:  Asthma

## 2025-05-24 ENCOUNTER — RESULTS FOLLOW-UP (OUTPATIENT)
Age: 30
End: 2025-05-24

## 2025-05-24 PROBLEM — R87.610 ASCUS OF CERVIX WITH NEGATIVE HIGH RISK HPV: Status: ACTIVE | Noted: 2025-05-24
